# Patient Record
Sex: FEMALE | Race: WHITE | NOT HISPANIC OR LATINO | Employment: FULL TIME | URBAN - METROPOLITAN AREA
[De-identification: names, ages, dates, MRNs, and addresses within clinical notes are randomized per-mention and may not be internally consistent; named-entity substitution may affect disease eponyms.]

---

## 2017-02-20 ENCOUNTER — MYC MEDICAL ADVICE (OUTPATIENT)
Dept: FAMILY MEDICINE | Facility: CLINIC | Age: 42
End: 2017-02-20

## 2017-02-20 DIAGNOSIS — Z01.419 WELL WOMAN EXAM WITH ROUTINE GYNECOLOGICAL EXAM: ICD-10-CM

## 2017-02-21 RX ORDER — DESOGESTREL AND ETHINYL ESTRADIOL 0.15-0.03
1 KIT ORAL DAILY
Qty: 84 TABLET | Refills: 0 | Status: SHIPPED | OUTPATIENT
Start: 2017-02-21 | End: 2017-02-21

## 2017-02-21 RX ORDER — DESOGESTREL AND ETHINYL ESTRADIOL 0.15-0.03
1 KIT ORAL DAILY
Qty: 84 TABLET | Refills: 1 | Status: SHIPPED | OUTPATIENT
Start: 2017-02-21 | End: 2017-05-05

## 2017-05-05 ENCOUNTER — OFFICE VISIT (OUTPATIENT)
Dept: FAMILY MEDICINE | Facility: CLINIC | Age: 42
End: 2017-05-05
Payer: COMMERCIAL

## 2017-05-05 VITALS
HEART RATE: 55 BPM | DIASTOLIC BLOOD PRESSURE: 68 MMHG | HEIGHT: 61 IN | OXYGEN SATURATION: 100 % | SYSTOLIC BLOOD PRESSURE: 109 MMHG | TEMPERATURE: 97.1 F | BODY MASS INDEX: 22.66 KG/M2 | RESPIRATION RATE: 16 BRPM | WEIGHT: 120 LBS

## 2017-05-05 DIAGNOSIS — Z30.9 ENCOUNTER FOR CONTRACEPTIVE MANAGEMENT, UNSPECIFIED CONTRACEPTIVE ENCOUNTER TYPE: ICD-10-CM

## 2017-05-05 DIAGNOSIS — Z01.419 WELL WOMAN EXAM WITH ROUTINE GYNECOLOGICAL EXAM: Primary | ICD-10-CM

## 2017-05-05 DIAGNOSIS — Z80.8 FAMILY HISTORY OF BASAL CELL CARCINOMA: ICD-10-CM

## 2017-05-05 PROCEDURE — 99396 PREV VISIT EST AGE 40-64: CPT | Performed by: NURSE PRACTITIONER

## 2017-05-05 RX ORDER — DESOGESTREL AND ETHINYL ESTRADIOL 0.15-0.03
1 KIT ORAL DAILY
Qty: 84 TABLET | Refills: 3 | Status: SHIPPED | OUTPATIENT
Start: 2017-05-05 | End: 2018-02-02

## 2017-05-05 NOTE — MR AVS SNAPSHOT
After Visit Summary   5/5/2017    Chela Villatoro    MRN: [id number]           Patient Information     Date Of Birth          1975        Visit Information        Provider Department      5/5/2017 2:40 PM Susan Mireles APRN [facility]        Today's Diagnoses     Well woman exam with routine gynecological exam    -  1    Encounter for contraceptive management, unspecified contraceptive encounter type        Family history of basal cell carcinoma           Follow-ups after your visit        Additional Services     DERMATOLOGY REFERRAL       Your provider has referred you to: FMG: [facility] - [place] [phone]   [email or web address]  UMP: [facility] - [place] [phone]   [email or web address]  N: [facility] - [place] / [place] / [place] / [place] [phone]   [email or web address]  N: [facility] [place] [phone]   [email or web address]  [facility] - [place] [phone]   [email or web address]  [facility] [place] [phone]   [email or web address]    Please be aware that coverage of these services is subject to the terms and limitations of your health insurance plan.  Call member services at your health plan with any benefit or coverage questions.      Please bring the following to your appointment:  Any x-rays, CTs or MRIs which have been performed.  Contact the facility where they were done to arrange for  prior to your scheduled appointment.  Any new CT, MRI or other procedures ordered by your specialist must be performed at a [place] facility or coordinated by your clinic's referral office.    List of current medications   This referral request   Any documents/labs given to  you for this referral            OB/GYN REFERRAL       Your provider has referred you to:  FMG: [facility] [phone]   [email or web address]    Please be aware that coverage of these services is subject to the terms and limitations of your health insurance plan.  Call member services at your health plan with any benefit or coverage questions.      Please bring the following with you to your appointment:    (1) Any X-Rays, CTs or MRIs which have been performed.  Contact the facility where they were done to arrange for  prior to your scheduled appointment.   (2) List of current medications   (3) This referral request   (4) Any documents/labs given to you for this referral                  Who to contact     If you have questions or need follow up information about today's clinic visit or your schedule please contact [facility] directly at [phone].  Normal or non-critical lab and imaging results will be communicated to you by MyChart, letter or phone within 4 business days after the clinic has received the results. If you do not hear from us within 7 days, please contact the clinic through MyChart or phone. If you have a critical or abnormal lab result, we will notify you by phone as soon as possible.  Submit refill requests through PixSense or call your pharmacy and they will forward the refill request to us. Please allow 3 business days for your refill to be completed.          Additional Information About Your Visit        Hive7hart Information     PixSense gives you secure access to your electronic health record. If you see a primary care provider, you can also send messages to your care team and make appointments. If you have questions, please call your primary care clinic.  If you do not have a primary care provider, please call [phone] and they will assist you.        Care EveryWhere ID     This is your Care EveryWhere ID.  "This could be used by other organizations to access your [place] medical records  [id number]        Your Vitals Were     Pulse Temperature Respirations Height Last Period Pulse Oximetry    55 97.1  F (36.2  C) (Oral) 16 5' 1.25\" (1.556 m) 04/10/2017 (Approximate) 100%    BMI (Body Mass Index)                   22.49 kg/m2            Blood Pressure from Last 3 Encounters:   05/05/17 109/68   11/03/16 90/56   10/31/16 90/56    Weight from Last 3 Encounters:   05/05/17 120 lb (54.4 kg)   11/03/16 122 lb (55.3 kg)   10/31/16 122 lb 12.8 oz (55.7 kg)              We Performed the Following     DERMATOLOGY REFERRAL     OB/GYN REFERRAL          Where to get your medicines      These medications were sent to [facility] [address] AT [address] [address]     Phone:  [phone]     desogestrel-ethinyl estradiol 0.15-30 MG-MCG per tablet          Primary Care Provider Office Phone # Fax #    Susan Velazquez MANUEL [facility] [phone]       [address] [address]        Thank you!     Thank you for choosing [facility]  for your care. Our goal is always to provide you with excellent care. Hearing back from our patients is one way we can continue to improve our services. Please take a few minutes to complete the written survey that you may receive in the mail after your visit with us. Thank you!             Your Updated Medication List - Protect others around you: Learn how to safely use, store and throw away your medicines at www.disposemymeds.org.          This list is accurate as of: 5/5/17 11:59 PM.  Always use your most recent med list.                   Brand Name Dispense Instructions for use    desogestrel-ethinyl estradiol 0.15-30 MG-MCG per tablet    RECLIPSEN    84 tablet    Take 1 tablet by mouth daily         "

## 2017-05-05 NOTE — PROGRESS NOTES
SUBJECTIVE:     CC: Chela Villatoro is an 42 year old woman who presents for preventive health visit.     Physical   Annual:     Getting at least 3 servings of Calcium per day::  Yes    Bi-annual eye exam::  NO    Dental care twice a year::  Yes    Sleep apnea or symptoms of sleep apnea::  None    Diet::  Regular (no restrictions)    Frequency of exercise::  4-5 days/week    Duration of exercise::  30-45 minutes    Taking medications regularly::  Yes    Medication side effects::  None    Additional concerns today::  No      Today's PHQ-2 Score:   PHQ-2 ( 1999 Pfizer) 5/5/2017   Little interest or pleasure in doing things Not at all   Feeling down, depressed or hopeless Not at all   PHQ-2 Score 0       Abuse: Current or Past(Physical, Sexual or Emotional)- No  Do you feel safe in your environment - Yes    Social History   Substance Use Topics     Smoking status: Never Smoker     Smokeless tobacco: Never Used     Alcohol use 0.5 oz/week      Comment: occasionally     The patient does not drink >3 drinks per day nor >7 drinks per week.    Recent Labs   Lab Test  12/20/12   0836 09/16/09   CHOL  204*  176   HDL  100  70   LDL  75  94   TRIG  143  61   CHOLHDLRATIO  2.0  2.5       Reviewed orders with patient.  Reviewed health maintenance and updated orders accordingly - Yes    Mammo Decision Support:  Mammogram not appropriate for this patient based on age.    Pertinent mammograms are reviewed under the imaging tab.  History of abnormal Pap smear: NO - age 30- 65 PAP every 3 years recommended    Reviewed and updated as needed this visit by clinical staff  Tobacco  Allergies  Meds  Problems  Med Hx  Surg Hx  Fam Hx  Soc Hx          Reviewed and updated as needed this visit by Provider  Allergies  Meds  Problems  Med Hx  Surg Hx  Fam Hx            ROS:  C: NEGATIVE for fever, chills, change in weight  I: NEGATIVE for worrisome rashes, moles or lesions  E: NEGATIVE for vision changes or irritation  ENT:  "NEGATIVE for ear, mouth and throat problems  R: NEGATIVE for significant cough or SOB  B: NEGATIVE for masses, tenderness or discharge  CV: NEGATIVE for chest pain, palpitations or peripheral edema  GI: NEGATIVE for nausea, abdominal pain, heartburn, or change in bowel habits  : NEGATIVE for unusual urinary or vaginal symptoms. Periods are regular.  M: NEGATIVE for significant arthralgias or myalgia  N: NEGATIVE for weakness, dizziness or paresthesias  P: NEGATIVE for changes in mood or affect    Problem list, Medication list, Allergies, and Medical/Social/Surgical histories reviewed in [facility] and updated as appropriate.  Labs reviewed in EPIC  BP Readings from Last 3 Encounters:   05/05/17 109/68   11/03/16 90/56   10/31/16 90/56    Wt Readings from Last 3 Encounters:   05/05/17 120 lb (54.4 kg)   11/03/16 122 lb (55.3 kg)   10/31/16 122 lb 12.8 oz (55.7 kg)                  OBJECTIVE:     /68  Pulse 55  Temp 97.1  F (36.2  C) (Oral)  Resp 16  Ht 5' 1.25\" (1.556 m)  Wt 120 lb (54.4 kg)  LMP 04/10/2017 (Approximate)  SpO2 100%  BMI 22.49 kg/m2  EXAM:  GENERAL: healthy, alert and no distress  EYES: Eyes grossly normal to inspection, PERRL and conjunctivae and sclerae normal  HENT: ear canals and TM's normal, nose and mouth without ulcers or lesions  NECK: no adenopathy, no asymmetry, masses, or scars and thyroid normal to palpation  RESP: lungs clear to auscultation - no rales, rhonchi or wheezes  BREAST: normal without masses, tenderness or nipple discharge and no palpable axillary masses or adenopathy  CV: regular rate and rhythm, normal S1 S2, no S3 or S4, no murmur, click or rub, no peripheral edema and peripheral pulses strong  ABDOMEN: soft, nontender, no hepatosplenomegaly, no masses and bowel sounds normal  MS: no gross musculoskeletal defects noted, no edema  SKIN: no suspicious lesions or rashes  NEURO: Normal strength and tone, mentation intact and speech normal  PSYCH: mentation appears " "normal, affect normal/bright    ASSESSMENT/PLAN:         ICD-10-CM    1. Well woman exam with routine gynecological exam Z01.419 desogestrel-ethinyl estradiol (RECLIPSEN) 0.15-30 MG-MCG per tablet   2. Encounter for contraceptive management, unspecified contraceptive encounter type Z30.9 OB/GYN REFERRAL   3. Family history of basal cell carcinoma Z80.8 DERMATOLOGY REFERRAL     Refer to GYN.  Would like to consider IUD vs OCP.      Refer to derm for full skin check  Used to tan a lot and mother had several Basal cell carcinomas.    COUNSELING:  Reviewed preventive health counseling, as reflected in patient instructions         reports that she has never smoked. She has never used smokeless tobacco.    Estimated body mass index is 22.49 kg/(m^2) as calculated from the following:    Height as of this encounter: 5' 1.25\" (1.556 m).    Weight as of this encounter: 120 lb (54.4 kg).       Counseling Resources:  ATP IV Guidelines  Pooled Cohorts Equation Calculator  Breast Cancer Risk Calculator  FRAX Risk Assessment  ICSI Preventive Guidelines  Dietary Guidelines for Americans, 2010  USDA's MyPlate  ASA Prophylaxis  Lung CA Screening    MANUEL Duran CNP  [facility]  Answers for HPI/ROS submitted by the patient on 5/5/2017   Q1: Little interest or pleasure in doing things: 0=Not at all  Q2: Feeling down, depressed or hopeless: 0=Not at all  PHQ-2 Score: 0    "

## 2017-11-14 ENCOUNTER — ALLIED HEALTH/NURSE VISIT (OUTPATIENT)
Dept: NURSING | Facility: CLINIC | Age: 42
End: 2017-11-14
Payer: COMMERCIAL

## 2017-11-14 DIAGNOSIS — Z23 NEED FOR PROPHYLACTIC VACCINATION AND INOCULATION AGAINST INFLUENZA: Primary | ICD-10-CM

## 2017-11-14 PROCEDURE — 90471 IMMUNIZATION ADMIN: CPT

## 2017-11-14 PROCEDURE — 90686 IIV4 VACC NO PRSV 0.5 ML IM: CPT

## 2017-11-14 NOTE — MR AVS SNAPSHOT
After Visit Summary   11/14/2017    Chela Villatoro    MRN: [id number]           Patient Information     Date Of Birth          1975        Visit Information        Provider Department      11/14/2017 6:15 PM [facility] FLU [facility]IC [facility]        Today's Diagnoses     Need for prophylactic vaccination and inoculation against influenza    -  1       Follow-ups after your visit        Who to contact     If you have questions or need follow up information about today's clinic visit or your schedule please contact [facility] directly at [phone].  Normal or non-critical lab and imaging results will be communicated to you by Nimble CRMhart, letter or phone within 4 business days after the clinic has received the results. If you do not hear from us within 7 days, please contact the clinic through Local Liftt or phone. If you have a critical or abnormal lab result, we will notify you by phone as soon as possible.  Submit refill requests through CreditPoint Software or call your pharmacy and they will forward the refill request to us. Please allow 3 business days for your refill to be completed.          Additional Information About Your Visit        MyChart Information     CreditPoint Software gives you secure access to your electronic health record. If you see a primary care provider, you can also send messages to your care team and make appointments. If you have questions, please call your primary care clinic.  If you do not have a primary care provider, please call [phone] and they will assist you.        Care EveryWhere ID     This is your Care EveryWhere ID. This could be used by other organizations to access your [place] medical records  [id number]         Blood Pressure from Last 3 Encounters:   05/05/17 109/68   11/03/16 90/56   10/31/16 90/56    Weight from Last 3 Encounters:   05/05/17 120 lb (54.4 kg)   11/03/16 122 lb (55.3 kg)   10/31/16 122 lb 12.8 oz (55.7 kg)               We Performed the Following     FLU VAC, SPLIT VIRUS IM > 3 YO (QUADRIVALENT) [92406]     Vaccine Administration, Initial [92040]        Primary Care Provider Office Phone # Fax #    MANUEL Duran [phone]  [phone]        Equal Access to Services     JAY DAS : Hadii aad ku hadasho Soomaali, waaxda luqadaha, qaybta kaalmada adeegyada, waxay idiin hayaan adekarl ritteraraselena naylor . So [facility] [phone].    ATENCIÓN: Si habla español, tiene a raman disposición servicios gratuitos de asistencia lingüística. Joseame al [phone].    We comply with applicable federal civil rights laws and [place] laws. We do not discriminate on the basis of race, color, national origin, age, disability, sex, sexual orientation, or gender identity.            Thank you!     Thank you for choosing [facility]  for your care. Our goal is always to provide you with excellent care. Hearing back from our patients is one way we can continue to improve our services. Please take a few minutes to complete the written survey that you may receive in the mail after your visit with us. Thank you!             Your Updated Medication List - Protect others around you: Learn how to safely use, store and throw away your medicines at www.disposemymeds.org.          This list is accurate as of: 11/14/17  6:20 PM.  Always use your most recent med list.                   Brand Name Dispense Instructions for use Diagnosis    desogestrel-ethinyl estradiol 0.15-30 MG-MCG per tablet    RECLIPSEN    84 tablet    Take 1 tablet by mouth daily    Well woman exam with routine gynecological exam

## 2018-02-02 ENCOUNTER — OFFICE VISIT (OUTPATIENT)
Dept: MIDWIFE SERVICES | Facility: CLINIC | Age: 43
End: 2018-02-02
Payer: COMMERCIAL

## 2018-02-02 ENCOUNTER — RADIANT APPOINTMENT (OUTPATIENT)
Dept: MAMMOGRAPHY | Facility: CLINIC | Age: 43
End: 2018-02-02
Payer: COMMERCIAL

## 2018-02-02 VITALS
HEIGHT: 62 IN | BODY MASS INDEX: 22.82 KG/M2 | WEIGHT: 124 LBS | SYSTOLIC BLOOD PRESSURE: 116 MMHG | DIASTOLIC BLOOD PRESSURE: 82 MMHG

## 2018-02-02 DIAGNOSIS — Z13.220 ENCOUNTER FOR LIPID SCREENING FOR CARDIOVASCULAR DISEASE: ICD-10-CM

## 2018-02-02 DIAGNOSIS — Z23 NEED FOR PROPHYLACTIC VACCINATION WITH COMBINED DIPHTHERIA-TETANUS-PERTUSSIS (DTP) VACCINE: ICD-10-CM

## 2018-02-02 DIAGNOSIS — Z13.29 SCREENING FOR THYROID DISORDER: ICD-10-CM

## 2018-02-02 DIAGNOSIS — Z01.419 ENCOUNTER FOR GYNECOLOGICAL EXAMINATION WITHOUT ABNORMAL FINDING: Primary | ICD-10-CM

## 2018-02-02 DIAGNOSIS — Z13.6 ENCOUNTER FOR LIPID SCREENING FOR CARDIOVASCULAR DISEASE: ICD-10-CM

## 2018-02-02 DIAGNOSIS — Z12.31 VISIT FOR SCREENING MAMMOGRAM: ICD-10-CM

## 2018-02-02 DIAGNOSIS — Z01.419 WELL WOMAN EXAM WITH ROUTINE GYNECOLOGICAL EXAM: ICD-10-CM

## 2018-02-02 DIAGNOSIS — Z30.41 USES ORAL CONTRACEPTION: ICD-10-CM

## 2018-02-02 DIAGNOSIS — Z13.1 SCREENING FOR DIABETES MELLITUS: ICD-10-CM

## 2018-02-02 PROCEDURE — 87624 HPV HI-RISK TYP POOLED RSLT: CPT | Performed by: ADVANCED PRACTICE MIDWIFE

## 2018-02-02 PROCEDURE — 80061 LIPID PANEL: CPT | Performed by: ADVANCED PRACTICE MIDWIFE

## 2018-02-02 PROCEDURE — 82947 ASSAY GLUCOSE BLOOD QUANT: CPT | Performed by: ADVANCED PRACTICE MIDWIFE

## 2018-02-02 PROCEDURE — 36415 COLL VENOUS BLD VENIPUNCTURE: CPT | Performed by: ADVANCED PRACTICE MIDWIFE

## 2018-02-02 PROCEDURE — 77067 SCR MAMMO BI INCL CAD: CPT | Mod: TC

## 2018-02-02 PROCEDURE — 99396 PREV VISIT EST AGE 40-64: CPT | Performed by: ADVANCED PRACTICE MIDWIFE

## 2018-02-02 PROCEDURE — G0145 SCR C/V CYTO,THINLAYER,RESCR: HCPCS | Performed by: ADVANCED PRACTICE MIDWIFE

## 2018-02-02 PROCEDURE — 77063 BREAST TOMOSYNTHESIS BI: CPT | Mod: TC

## 2018-02-02 PROCEDURE — 84443 ASSAY THYROID STIM HORMONE: CPT | Performed by: ADVANCED PRACTICE MIDWIFE

## 2018-02-02 PROCEDURE — 90715 TDAP VACCINE 7 YRS/> IM: CPT | Performed by: ADVANCED PRACTICE MIDWIFE

## 2018-02-02 RX ORDER — DESOGESTREL AND ETHINYL ESTRADIOL 0.15-0.03
1 KIT ORAL DAILY
Qty: 84 TABLET | Refills: 3 | Status: SHIPPED | OUTPATIENT
Start: 2018-02-02 | End: 2018-08-07

## 2018-02-02 ASSESSMENT — ANXIETY QUESTIONNAIRES
2. NOT BEING ABLE TO STOP OR CONTROL WORRYING: NOT AT ALL
IF YOU CHECKED OFF ANY PROBLEMS ON THIS QUESTIONNAIRE, HOW DIFFICULT HAVE THESE PROBLEMS MADE IT FOR YOU TO DO YOUR WORK, TAKE CARE OF THINGS AT HOME, OR GET ALONG WITH OTHER PEOPLE: NOT DIFFICULT AT ALL
1. FEELING NERVOUS, ANXIOUS, OR ON EDGE: NOT AT ALL
5. BEING SO RESTLESS THAT IT IS HARD TO SIT STILL: NOT AT ALL
3. WORRYING TOO MUCH ABOUT DIFFERENT THINGS: NOT AT ALL
7. FEELING AFRAID AS IF SOMETHING AWFUL MIGHT HAPPEN: NOT AT ALL
6. BECOMING EASILY ANNOYED OR IRRITABLE: NOT AT ALL
GAD7 TOTAL SCORE: 0

## 2018-02-02 ASSESSMENT — PATIENT HEALTH QUESTIONNAIRE - PHQ9: 5. POOR APPETITE OR OVEREATING: NOT AT ALL

## 2018-02-02 NOTE — MR AVS SNAPSHOT
After Visit Summary   2/2/2018    Chela Villatoro    MRN: [id number]           Patient Information     Date Of Birth          1975        Visit Information        Provider Department      2/2/2018 10:00 AM Trudy Esquivel APRN [facility]        Today's Diagnoses     Encounter for gynecological examination without abnormal finding    -  1    Well woman exam with routine gynecological exam        Encounter for lipid screening for cardiovascular disease        Screening for diabetes mellitus        Screening for thyroid disorder        Uses oral contraception          Care Instructions      Preventive Health Recommendations  Female Ages 40-50    Yearly exam:     See your health care provider every year in order to  1. Review health changes   2. Discuss preventive care    3. Review your medicines if your provider prescribed any      Get a Pap test every five years with co-testing for HPV (unless you have an abnormal result and your provider advises testing more often)       You do not need a Pap test if your uterus was removed (hysterectomy) and you have not had cancer      You should be tested each year for STD's (sexually transmitted diseases) if you are at risk      Talk with your provider about starting mammogram screenings for breast cancer and how often you should be screened      Have a cholesterol test every 3-5 years       Have a diabetes test (fasting glucose) after age 45. If you are at risk for diabetes, you should have this test every 3 years      You may begin to experience some changes in your menstrual cycle as you age, you may also begin to have some symptoms of perimenopause such as hot flashes. Women typically go through menopause anytime between 45-55 years of age.    Shots: Get a flu shot each year. Get a tetanus shot every 10 years.     Nutrition:     Eat at least 5 servings of fruits and vegetables each day    Eat REAL food, stay away from processed  foods    Eat whole-grain bread, whole-wheat pasta and brown rice instead of white grains and rice    For bone health:  Eat calcium-rich foods or take calcium pills (500 to 600 mg) twice a day with food (1200 mg per day). Also take vitamin D (1,000-3,000 IUs) each day.     Lifestyle    Exercise at least 150 minutes a week (an average of 30 minutes a day, 5 days a week). This will help you control your weight and prevent disease.    Limit alcohol to one drink per day    No smoking     Wear sunscreen to prevent skin cancer    See your dentist every six months for an exam and cleaning    Weight bearing exercise to encourage good bone health prevent osteoporosis            Follow-ups after your visit        Follow-up notes from your care team     Return in about 1 year (around 2/2/2019).      Who to contact     If you have questions or need follow up information about today's clinic visit or your schedule please contact [facility] directly at [phone].  Normal or non-critical lab and imaging results will be communicated to you by TG Publishinghart, letter or phone within 4 business days after the clinic has received the results. If you do not hear from us within 7 days, please contact the clinic through Audionamix or phone. If you have a critical or abnormal lab result, we will notify you by phone as soon as possible.  Submit refill requests through Audionamix or call your pharmacy and they will forward the refill request to us. Please allow 3 business days for your refill to be completed.          Additional Information About Your Visit        Audionamix Information     Audionamix gives you secure access to your electronic health record. If you see a primary care provider, you can also send messages to your care team and make appointments. If you have questions, please call your primary care clinic.  If you do not have a primary care provider, please call [phone] and they will assist you.        Care EveryWhere  "ID     This is your Care EveryWhere ID. This could be used by other organizations to access your [place] medical records  [id number]        Your Vitals Were     Height Last Period Breastfeeding? BMI (Body Mass Index)          5' 1.5\" (1.562 m) 01/08/2018 (Exact Date) No 23.05 kg/m2         Blood Pressure from Last 3 Encounters:   02/02/18 116/82   05/05/17 109/68   11/03/16 90/56    Weight from Last 3 Encounters:   02/02/18 124 lb (56.2 kg)   05/05/17 120 lb (54.4 kg)   11/03/16 122 lb (55.3 kg)              We Performed the Following     Glucose     HPV High Risk Types DNA Cervical     Lipid panel reflex to direct LDL Fasting     Pap imaged thin layer screen with HPV - recommended age 30 - 65     TSH with free T4 reflex          Where to get your medicines      These medications were sent to [facility] [address] [place] [address] AT [address] [address]     Phone:  [phone]     desogestrel-ethinyl estradiol 0.15-30 MG-MCG per tablet          Primary Care Provider Office Phone # Fax #    Susan LaceyMANUEL Morrison [facility] [phone] [address] [facility] [address]        Equal Access to Services     JAY DAS : Hadii kunal mccarthy hadasho Soomaali, waaxda luqadaha, qaybta kaalmada adesabrina, guido ewing. So [facility] [phone].    ATENCIÓN: Si habla español, tiene a raman disposición servicios gratuitos de asistencia lingüística. Roldan al [phone].    We comply with applicable federal civil rights laws and [place] laws. We do not discriminate on the basis of race, color, national origin, age, disability, sex, sexual orientation, or gender identity.            Thank you!     Thank you for choosing [facility]  for your care. Our goal is always to provide you with excellent care. Hearing back from our patients is one way we can continue to improve our services. Please take a few " minutes to complete the written survey that you may receive in the mail after your visit with us. Thank you!             Your Updated Medication List - Protect others around you: Learn how to safely use, store and throw away your medicines at www.disposemymeds.org.          This list is accurate as of 2/2/18 10:44 AM.  Always use your most recent med list.                   Brand Name Dispense Instructions for use Diagnosis    desogestrel-ethinyl estradiol 0.15-30 MG-MCG per tablet    RECLIPSEN    84 tablet    Take 1 tablet by mouth daily    Well woman exam with routine gynecological exam

## 2018-02-02 NOTE — PATIENT INSTRUCTIONS
Preventive Health Recommendations  Female Ages 40-50    Yearly exam:     See your health care provider every year in order to  1. Review health changes   2. Discuss preventive care    3. Review your medicines if your provider prescribed any      Get a Pap test every five years with co-testing for HPV (unless you have an abnormal result and your provider advises testing more often)       You do not need a Pap test if your uterus was removed (hysterectomy) and you have not had cancer      You should be tested each year for STD's (sexually transmitted diseases) if you are at risk      Talk with your provider about starting mammogram screenings for breast cancer and how often you should be screened      Have a cholesterol test every 3-5 years       Have a diabetes test (fasting glucose) after age 45. If you are at risk for diabetes, you should have this test every 3 years      You may begin to experience some changes in your menstrual cycle as you age, you may also begin to have some symptoms of perimenopause such as hot flashes. Women typically go through menopause anytime between 45-55 years of age.    Shots: Get a flu shot each year. Get a tetanus shot every 10 years.     Nutrition:     Eat at least 5 servings of fruits and vegetables each day    Eat REAL food, stay away from processed foods    Eat whole-grain bread, whole-wheat pasta and brown rice instead of white grains and rice    For bone health:  Eat calcium-rich foods or take calcium pills (500 to 600 mg) twice a day with food (1200 mg per day). Also take vitamin D (1,000-3,000 IUs) each day.     Lifestyle    Exercise at least 150 minutes a week (an average of 30 minutes a day, 5 days a week). This will help you control your weight and prevent disease.    Limit alcohol to one drink per day    No smoking     Wear sunscreen to prevent skin cancer    See your dentist every six months for an exam and cleaning    Weight bearing exercise to encourage good bone  health prevent osteoporosis

## 2018-02-02 NOTE — PROGRESS NOTES
Chela is a 43 year old  female who presents for annual exam.     Besides routine health maintenance, she has no other health concerns today.  Menses are regular q 28-30 days and normal lasting 4 days.   Menses flow: normal.    Patient's last menstrual period was 2018 (exact date).  Using oral contraceptives for contraception.    She is not currently considering pregnancy.    REPRODUCTIVE/GYNECOLOGIC HISTORY:  Chela is sexually active with male partner(s) and is currently in monogamous relationship.   STI testing offered?  Declined  History of abnormal Pap smear:  Yes  SOCIAL HISTORY  Abuse: does not report having previously been physical or sexually abused.    Do you feel safe in your environment? YES  Excited to have recently gotten engaged! Has two children, age 15 and 13; her fiance has one son, age 14.    She  reports that she has never smoked. She has never used smokeless tobacco.      Last PHQ-9 score on record =   PHQ-9 SCORE 2018   Total Score -   Total Score 0     Last GAD7 score on record =   ALMAS-7 SCORE 2018   Total Score -   Total Score 0     Alcohol Score = 3    HEALTH MAINTENANCE:  Cholesterol:   Cholesterol   Date Value Ref Range Status   2012 204 (H) 0 - 200 mg/dL Final     Comment:     LDL Cholesterol is the primary guide to therapy.   The NCEP recommends further evaluation of: patients with cholesterol greater   than 200 mg/dL if additional risk factors are present, cholesterol greater   than   240 mg/dL, triglycerides greater than 150 mg/dL, or HDL less than 40 mg/dL.   2009 176 115 - 199 mg/dL    History of abnormal lipids: No  Mammo: Never.   Regular Self Breast Exams: No  TSH:   TSH   Date Value Ref Range Status   2013 0.65 0.4 - 5.0 mU/L Final     Pap:  Lab Results   Component Value Date    PAP ASC-US 2015    PAP NIL 2011    PAP NIL 2010     Health maintenance updated:  yes    HEALTHY HABITS  Eating habits: eats regular meals and  follows a balanced nutrition diet  Calcium/Vitamin D intake: source:  Dairy (cheese and yogurt).  Does not drink milk Adequate? No.  Recommended calcium and vitamin D supplementation.  Exercise: How often do you exercise? 3-4 times/week; Cardio and Strength Training, goes to [employer]  Have you had an eye exam in the last two years? NO (Recommended)  Do you routinely see the dentist once or twice yearly? YES      HISTORY:  Obstetric History       T2      L2     SAB0   TAB0   Ectopic0   Multiple0   Live Births2       # Outcome Date GA Lbr Maykel/2nd Weight Sex Delivery Anes PTL Lv   2 Term 08/15/04    M    MATTHIAS   1 Term 02    F    MATTHIAS        Past Medical History:   Diagnosis Date     ASCUS favor benign 2015    neg HPV Plan cotest in 3 yrs.     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 No     Social History Narrative    Caffeine intake/servings daily - 1    Calcium intake/servings daily - 1-2    Exercise 3 times weekly - describe walking, running    Sunscreen used - Yes    Seatbelts used - Yes    Guns stored in the home - No  "   Self Breast Exam - occ    Pap test up to date -  Last pap 5/13/2010    Eye exam up to date -  Yes    Dental exam up to date -  Yes    DEXA scan up to date -  Not Applicable    Flex Sig/Colonoscopy up to date -  Not Applicable    Mammography up to date -  Not Applicable    Immunizations reviewed and up to date - tdap 2008. Flu shot already    Abuse: Current or Past (Physical, Sexual or Emotional) - No    Do you feel safe in your environment - Yes    Do you cope well with stress - Yes    Do you suffer from insomnia - No    Last updated by: Shruti Eason MA.                               Current Outpatient Prescriptions:      desogestrel-ethinyl estradiol (RECLIPSEN) 0.15-30 MG-MCG per tablet, Take 1 tablet by mouth daily, Disp: 84 tablet, Rfl: 3     [DISCONTINUED] desogestrel-ethinyl estradiol (RECLIPSEN) 0.15-30 MG-MCG per tablet, Take 1 tablet by mouth daily, Disp: 84 tablet, Rfl: 3     Allergies   Allergen Reactions     Nkda [No Known Drug Allergies]        Past medical, surgical, social and family history were reviewed and updated in EPIC.    ROS:   12 point review of systems negative other than symptoms noted below.  Musculoskeletal: Joint Pain    PHYSICAL EXAM:  /82  Ht 5' 1.5\" (1.562 m)  Wt 124 lb (56.2 kg)  LMP 01/08/2018 (Exact Date)  Breastfeeding? No  BMI 23.05 kg/m2   BMI: Body mass index is 23.05 kg/(m^2).  Constitutional: healthy, alert and no distress  Neck: symmetrical, thyroid normal size, no masses present, no lymphadenopathy present.   Cardiovascular: RRR, no murmurs present  Respiratory: breathing unlabored, lungs CTA bilaterally  Breast: normal without masses, tenderness or nipple discharge and no palpable axillary masses or adenopathy  Gastrointestinal: abdomen soft, non-tender, bowel sounds present  PELVIC EXAM:  Vulva: No lesions, no adenopathy, BUS WNL  Vagina: Moist, pink, discharge normal, well rugated, no lesions  Cervix: smooth, pink, small nabothian cyst present at 8' " oclock. Pap smear collected.  Uterus: Normal size, non-tender, mobile  Ovaries: No masses palpated  Rectal exam: deferred    ASSESSMENT/PLAN:    ICD-10-CM    1. Encounter for gynecological examination without abnormal finding Z01.419 Pap imaged thin layer screen with HPV - recommended age 30 - 65     HPV High Risk Types DNA Cervical   2. Well woman exam with routine gynecological exam Z01.419 desogestrel-ethinyl estradiol (RECLIPSEN) 0.15-30 MG-MCG per tablet   3. Encounter for lipid screening for cardiovascular disease Z13.220 Lipid panel reflex to direct LDL Fasting    Z13.6    4. Screening for diabetes mellitus Z13.1 Glucose   5. Screening for thyroid disorder Z13.29 TSH with free T4 reflex           COUNSELING:   Reviewed preventive health counseling, as reflected in patient instructions       Regular exercise       Healthy diet/nutrition       Vision screening       Immunizations    Vaccinated for: TDAP  (last Tetanus in 2008).         Contraception: oral contraceptive pill refill sent to pharmacy.       Osteoporosis Prevention/Bone Health: Recommended calcium and vitamin D.  Mammogram today.  Educated on breast warning signs and breast self-awareness.  Pap smear cotest collected today.  Lipids, random glucose and TSH drawn today.  Considering a Mirena, as she has had one in the past.  Will schedule an appointment if she would like to have one placed.     reports that she has never smoked. She has never used smokeless tobacco.    Return to clinic in one year for annual exam or sooner with any concerns.    Trudy Esquivel, DNP, APRN, CNM

## 2018-02-03 LAB
CHOLEST SERPL-MCNC: 236 MG/DL
GLUCOSE SERPL-MCNC: 68 MG/DL (ref 70–99)
HDLC SERPL-MCNC: 118 MG/DL
LDLC SERPL CALC-MCNC: 95 MG/DL
NONHDLC SERPL-MCNC: 118 MG/DL
TRIGL SERPL-MCNC: 117 MG/DL
TSH SERPL DL<=0.005 MIU/L-ACNC: 1.12 MU/L (ref 0.4–4)

## 2018-02-03 ASSESSMENT — PATIENT HEALTH QUESTIONNAIRE - PHQ9: SUM OF ALL RESPONSES TO PHQ QUESTIONS 1-9: 0

## 2018-02-03 ASSESSMENT — ANXIETY QUESTIONNAIRES: GAD7 TOTAL SCORE: 0

## 2018-02-06 LAB
COPATH REPORT: NORMAL
PAP: NORMAL

## 2018-02-08 LAB
FINAL DIAGNOSIS: NORMAL
HPV HR 12 DNA CVX QL NAA+PROBE: NEGATIVE
HPV16 DNA SPEC QL NAA+PROBE: NEGATIVE
HPV18 DNA SPEC QL NAA+PROBE: NEGATIVE
SPECIMEN DESCRIPTION: NORMAL
SPECIMEN SOURCE CVX/VAG CYTO: NORMAL

## 2018-08-06 ENCOUNTER — E-VISIT (OUTPATIENT)
Dept: FAMILY MEDICINE | Facility: CLINIC | Age: 43
End: 2018-08-06
Payer: COMMERCIAL

## 2018-08-06 DIAGNOSIS — J01.90 ACUTE SINUSITIS WITH SYMPTOMS > 10 DAYS: Primary | ICD-10-CM

## 2018-08-06 PROCEDURE — 98969 ZZC NONPHYSICIAN ONLINE ASSESSMENT AND MANAGEMENT: CPT | Performed by: NURSE PRACTITIONER

## 2018-08-06 RX ORDER — AZITHROMYCIN 250 MG/1
TABLET, FILM COATED ORAL
Qty: 6 TABLET | Refills: 0 | Status: SHIPPED | OUTPATIENT
Start: 2018-08-06 | End: 2018-09-27

## 2018-08-15 ENCOUNTER — THERAPY VISIT (OUTPATIENT)
Dept: PHYSICAL THERAPY | Facility: CLINIC | Age: 43
End: 2018-08-15
Payer: COMMERCIAL

## 2018-08-15 DIAGNOSIS — M25.551 HIP PAIN, RIGHT: ICD-10-CM

## 2018-08-15 DIAGNOSIS — M54.5 RIGHT LOW BACK PAIN, UNSPECIFIED CHRONICITY, WITH SCIATICA PRESENCE UNSPECIFIED: Primary | ICD-10-CM

## 2018-08-15 PROCEDURE — 97530 THERAPEUTIC ACTIVITIES: CPT | Mod: GP | Performed by: PHYSICAL THERAPIST

## 2018-08-15 PROCEDURE — 97110 THERAPEUTIC EXERCISES: CPT | Mod: GP | Performed by: PHYSICAL THERAPIST

## 2018-08-15 PROCEDURE — 97161 PT EVAL LOW COMPLEX 20 MIN: CPT | Mod: GP | Performed by: PHYSICAL THERAPIST

## 2018-08-15 PROCEDURE — 97010 HOT OR COLD PACKS THERAPY: CPT | Mod: GP | Performed by: PHYSICAL THERAPIST

## 2018-08-15 NOTE — MR AVS SNAPSHOT
After Visit Summary   8/15/2018    Chela Villatoro    MRN: [id number]           Patient Information     Date Of Birth          1975        Visit Information        Provider Department      8/15/2018 10:20 AM Jillian Kinsey PT [facility]        Today's Diagnoses     Right low back pain, unspecified chronicity, with sciatica presence unspecified    -  1    Hip pain, right           Follow-ups after your visit        Who to contact     If you have questions or need follow up information about today's clinic visit or your schedule please contact [facility] directly at [phone].  Normal or non-critical lab and imaging results will be communicated to you by Vixarhart, letter or phone within 4 business days after the clinic has received the results. If you do not hear from us within 7 days, please contact the clinic through Vixarhart or phone. If you have a critical or abnormal lab result, we will notify you by phone as soon as possible.  Submit refill requests through Inbenta or call your pharmacy and they will forward the refill request to us. Please allow 3 business days for your refill to be completed.          Additional Information About Your Visit        MyChart Information     Inbenta gives you secure access to your electronic health record. If you see a primary care provider, you can also send messages to your care team and make appointments. If you have questions, please call your primary care clinic.  If you do not have a primary care provider, please call [phone] and they will assist you.        Care EveryWhere ID     This is your Care EveryWhere ID. This could be used by other organizations to access your [place] medical records  [id number]         Blood Pressure from Last 3 Encounters:   02/02/18 116/82   05/05/17 109/68   11/03/16 90/56    Weight from Last 3 Encounters:   02/02/18 56.2 kg (124 lb)   05/05/17 54.4 kg (120 lb)    11/03/16 55.3 kg (122 lb)              We Performed the Following     [facility] PT EVAL, LOW COMPLEXITY     HOT OR COLD PACKS THERAPY     [facility] INITIAL EVAL REPORT     THERAPEUTIC ACTIVITIES     THERAPEUTIC EXERCISES        Primary Care Provider Office Phone # Fax #    MANUEL Duran MOOK [phone] [address] [facility] [address]        Equal Access to Services     DEBORAH DAS : Hadii aad ku hadasho Soomaali, waaxda luqadaha, qaybta kaalmada adeegyada, waxay idiin hayaan adeeg kharash la'aan ah. So [facility] [phone].    ATENCIÓN: Si habla espambrosio, tiene a raman disposición servicios gratuitos de asistencia lingüística. Llame al [phone].    We comply with applicable federal civil rights laws and [place] laws. We do not discriminate on the basis of race, color, national origin, age, disability, sex, sexual orientation, or gender identity.            Thank you!     Thank you for choosing [facility]  for your care. Our goal is always to provide you with excellent care. Hearing back from our patients is one way we can continue to improve our services. Please take a few minutes to complete the written survey that you may receive in the mail after your visit with us. Thank you!             Your Updated Medication List - Protect others around you: Learn how to safely use, store and throw away your medicines at www.disposemymeds.org.          This list is accurate as of 8/15/18 10:57 AM.  Always use your most recent med list.                   Brand Name Dispense Instructions for use Diagnosis    azithromycin 250 MG tablet    ZITHROMAX    6 tablet    Two tablets first day, then one tablet daily for four days.    Acute sinusitis with symptoms > 10 days       desogestrel-ethinyl estradiol 0.15-30 MG-MCG per tablet    RECLIPSEN    84 tablet    Take 1 tablet by mouth daily    Well woman exam with routine gynecological exam

## 2018-08-15 NOTE — PROGRESS NOTES
[facility] Initial Evaluation  Subjective:  Physical Therapy Initial Evaluation   8/15/18   Precautions/Restrictions/MD instructions: PT eval and treat (self referral, need MD order after 90 days)   Therapist Impression:   Pt is a 42 y/o female, with 1 week history of right hip and low back pain. Pt presents with pain, decreased ROM/mobility, poor posture and decreased strength. These impairments limit their ability to walk, sit, stand and sleep. Skilled PT services necessary in order to reduce impairments and improve independent function.    Subjective:   Chief Complaint: R hip and low back pain, last week was playing cards reached forward and immediate pain in low back. Pt notes that has had low back pain before, but this is lasting longer.   DOI/onset: 18  DOS: none  Location: R sided low back and hip  Quality: sharp  Frequency: constant  Radiates: into R hip, nothing in leg   Pain scale: Rest 5/10 Activity 10/10    Sleepin-3X/night wakes due to pain   Exacerbated by: sitting, standing, walking, and sleeping  Relieved by: nothing Progression: worse   Previous Treatment: none Effect of prior treatment: n/a   PMH and/or surgical history: none to note   Imaging: none     Occupation:   Job duties: standing, walking, computer desk, driving   Current HEP/exercise regimen: nothing currently, prior to injury working out 4-5 X/week ([employer])  Patient's goals: walking painfree   Medications: BC   General health as reported by patient: good   Return to MD: self referred   Red Flags: none      HPI                    Objective:  LUMBAR:    Posture: rounded low back in sitting; slouched    Gait: analgic, shift away from right side and shortened step length on left      Neurological:    Motor Deficit:  Myotomes L R   L1-2 (hip flexion) 5/5 5/5   L3 (knee extension) 5/5 5/5   L4 (ankle DF) 5/5 5/5   L5 (g. toe ext) 5/5 5/5   S1 (ankle PF or knee flex) 5/5 5/5     Sensory Deficit,  Reflexes: light touch intact and symmetrical bilaterally L2-S1    Dural Signs:   L R   Slump + ++   SLR - +   Other:    - TA activation: poor      AROM: (Major, Moderate, Minimal or Nil loss)  Movement Loss Roshan Mod Min Nil Pain   Flexion X    Pain in low back    Extension X    Pain in low back (R)   Side Bend L   X  No pain   Side Bend R  X   Pain on right     Repeated movement testing:   Side glide limited to left and painful (on right side)    No pain with prone lying    Lumbar Mobility/Spring Testing: PA pressure tender to L4/5        System    Physical Exam    General     ROS    Assessment/Plan:    Patient is a 43 year old female with lumbar and right side hip complaints.    Patient has the following significant findings with corresponding treatment plan.                Diagnosis 1:  R hip pain, and LBP  Pain -  hot/cold therapy, manual therapy, self management, education, directional preference exercise and home program  Decreased ROM/flexibility - manual therapy and therapeutic exercise  Decreased joint mobility - manual therapy and therapeutic exercise  Decreased strength - therapeutic exercise and therapeutic activities  Inflammation - cold therapy and self management/home program  Impaired gait - gait training  Impaired muscle performance - neuro re-education  Decreased function - therapeutic activities  Impaired posture - neuro re-education  Instability -  Therapeutic Activity  Therapeutic Exercise    Therapy Evaluation Codes:   1) History comprised of:   Personal factors that impact the plan of care:      None.    Comorbidity factors that impact the plan of care are:      None.     Medications impacting care: Anti-inflammatory.  2) Examination of Body Systems comprised of:   Body structures and functions that impact the plan of care:      Hip and Lumbar spine.   Activity limitations that impact the plan of care are:      Driving, Dressing, Lifting, Running, Sitting, Stairs, Standing, Walking, Working and  Sleeping.  3) Clinical presentation characteristics are:   Stable/Uncomplicated.  4) Decision-Making    Low complexity using standardized patient assessment instrument and/or measureable assessment of functional outcome.  Cumulative Therapy Evaluation is: Low complexity.    Previous and current functional limitations:  (See Goal Flow Sheet for this information)    Short term and Long term goals: (See Goal Flow Sheet for this information)     Communication ability:  Patient appears to be able to clearly communicate and understand verbal and written communication and follow directions correctly.  Treatment Explanation - The following has been discussed with the patient:   RX ordered/plan of care  Anticipated outcomes  Possible risks and side effects  This patient would benefit from PT intervention to resume normal activities.   Rehab potential is good.    Frequency:  1 X week, once daily  Duration:  for 6 weeks  Discharge Plan:  Achieve all LTG.  Independent in home treatment program.  Reach maximal therapeutic benefit.    Please refer to the daily flowsheet for treatment today, total treatment time and time spent performing 1:1 timed codes.

## 2018-08-27 ENCOUNTER — THERAPY VISIT (OUTPATIENT)
Dept: PHYSICAL THERAPY | Facility: CLINIC | Age: 43
End: 2018-08-27
Payer: COMMERCIAL

## 2018-08-27 DIAGNOSIS — M25.551 HIP PAIN, RIGHT: ICD-10-CM

## 2018-08-27 DIAGNOSIS — M54.5 RIGHT LOW BACK PAIN, UNSPECIFIED CHRONICITY, WITH SCIATICA PRESENCE UNSPECIFIED: ICD-10-CM

## 2018-08-27 PROCEDURE — 97110 THERAPEUTIC EXERCISES: CPT | Mod: GP | Performed by: PHYSICAL THERAPIST

## 2018-08-27 PROCEDURE — 97140 MANUAL THERAPY 1/> REGIONS: CPT | Mod: GP | Performed by: PHYSICAL THERAPIST

## 2018-09-11 ENCOUNTER — THERAPY VISIT (OUTPATIENT)
Dept: PHYSICAL THERAPY | Facility: CLINIC | Age: 43
End: 2018-09-11
Payer: COMMERCIAL

## 2018-09-11 DIAGNOSIS — M25.551 HIP PAIN, RIGHT: ICD-10-CM

## 2018-09-11 DIAGNOSIS — M54.5 RIGHT LOW BACK PAIN, UNSPECIFIED CHRONICITY, WITH SCIATICA PRESENCE UNSPECIFIED: ICD-10-CM

## 2018-09-11 PROCEDURE — 97110 THERAPEUTIC EXERCISES: CPT | Mod: GP | Performed by: PHYSICAL THERAPIST

## 2018-09-11 PROCEDURE — 97140 MANUAL THERAPY 1/> REGIONS: CPT | Mod: GP | Performed by: PHYSICAL THERAPIST

## 2018-09-11 PROCEDURE — 97112 NEUROMUSCULAR REEDUCATION: CPT | Mod: GP | Performed by: PHYSICAL THERAPIST

## 2018-09-18 ENCOUNTER — THERAPY VISIT (OUTPATIENT)
Dept: PHYSICAL THERAPY | Facility: CLINIC | Age: 43
End: 2018-09-18
Payer: COMMERCIAL

## 2018-09-18 DIAGNOSIS — M54.5 RIGHT LOW BACK PAIN, UNSPECIFIED CHRONICITY, WITH SCIATICA PRESENCE UNSPECIFIED: ICD-10-CM

## 2018-09-18 DIAGNOSIS — M25.551 HIP PAIN, RIGHT: ICD-10-CM

## 2018-09-18 PROCEDURE — 97140 MANUAL THERAPY 1/> REGIONS: CPT | Mod: GP | Performed by: PHYSICAL THERAPIST

## 2018-09-18 PROCEDURE — 97112 NEUROMUSCULAR REEDUCATION: CPT | Mod: GP | Performed by: PHYSICAL THERAPIST

## 2018-09-18 PROCEDURE — 97110 THERAPEUTIC EXERCISES: CPT | Mod: GP | Performed by: PHYSICAL THERAPIST

## 2018-09-27 ENCOUNTER — RADIANT APPOINTMENT (OUTPATIENT)
Dept: GENERAL RADIOLOGY | Facility: CLINIC | Age: 43
End: 2018-09-27
Attending: NURSE PRACTITIONER
Payer: COMMERCIAL

## 2018-09-27 ENCOUNTER — OFFICE VISIT (OUTPATIENT)
Dept: FAMILY MEDICINE | Facility: CLINIC | Age: 43
End: 2018-09-27
Payer: COMMERCIAL

## 2018-09-27 VITALS
WEIGHT: 121 LBS | RESPIRATION RATE: 16 BRPM | BODY MASS INDEX: 22.49 KG/M2 | OXYGEN SATURATION: 100 % | DIASTOLIC BLOOD PRESSURE: 71 MMHG | HEART RATE: 65 BPM | SYSTOLIC BLOOD PRESSURE: 109 MMHG | TEMPERATURE: 97.8 F

## 2018-09-27 DIAGNOSIS — M25.551 HIP PAIN, RIGHT: Primary | ICD-10-CM

## 2018-09-27 DIAGNOSIS — M25.551 HIP PAIN, RIGHT: ICD-10-CM

## 2018-09-27 DIAGNOSIS — R53.83 FATIGUE, UNSPECIFIED TYPE: ICD-10-CM

## 2018-09-27 LAB
ALBUMIN SERPL-MCNC: 3.5 G/DL (ref 3.4–5)
ALP SERPL-CCNC: 57 U/L (ref 40–150)
ALT SERPL W P-5'-P-CCNC: 21 U/L (ref 0–50)
ANION GAP SERPL CALCULATED.3IONS-SCNC: 8 MMOL/L (ref 3–14)
AST SERPL W P-5'-P-CCNC: 24 U/L (ref 0–45)
BASOPHILS # BLD AUTO: 0 10E9/L (ref 0–0.2)
BASOPHILS NFR BLD AUTO: 0.5 %
BILIRUB SERPL-MCNC: 0.4 MG/DL (ref 0.2–1.3)
BUN SERPL-MCNC: 10 MG/DL (ref 7–30)
CALCIUM SERPL-MCNC: 8.9 MG/DL (ref 8.5–10.1)
CHLORIDE SERPL-SCNC: 103 MMOL/L (ref 94–109)
CO2 SERPL-SCNC: 24 MMOL/L (ref 20–32)
CREAT SERPL-MCNC: 0.77 MG/DL (ref 0.52–1.04)
DIFFERENTIAL METHOD BLD: NORMAL
EOSINOPHIL # BLD AUTO: 0.1 10E9/L (ref 0–0.7)
EOSINOPHIL NFR BLD AUTO: 1.8 %
ERYTHROCYTE [DISTWIDTH] IN BLOOD BY AUTOMATED COUNT: 12.6 % (ref 10–15)
GFR SERPL CREATININE-BSD FRML MDRD: 82 ML/MIN/1.7M2
GLUCOSE SERPL-MCNC: 83 MG/DL (ref 70–99)
HCT VFR BLD AUTO: 42.6 % (ref 35–47)
HGB BLD-MCNC: 13.8 G/DL (ref 11.7–15.7)
LYMPHOCYTES # BLD AUTO: 1.3 10E9/L (ref 0.8–5.3)
LYMPHOCYTES NFR BLD AUTO: 22.7 %
MCH RBC QN AUTO: 30.6 PG (ref 26.5–33)
MCHC RBC AUTO-ENTMCNC: 32.4 G/DL (ref 31.5–36.5)
MCV RBC AUTO: 95 FL (ref 78–100)
MONOCYTES # BLD AUTO: 0.6 10E9/L (ref 0–1.3)
MONOCYTES NFR BLD AUTO: 10 %
NEUTROPHILS # BLD AUTO: 3.7 10E9/L (ref 1.6–8.3)
NEUTROPHILS NFR BLD AUTO: 65 %
PLATELET # BLD AUTO: 338 10E9/L (ref 150–450)
POTASSIUM SERPL-SCNC: 4.2 MMOL/L (ref 3.4–5.3)
PROT SERPL-MCNC: 7.8 G/DL (ref 6.8–8.8)
RBC # BLD AUTO: 4.51 10E12/L (ref 3.8–5.2)
SODIUM SERPL-SCNC: 135 MMOL/L (ref 133–144)
TSH SERPL DL<=0.005 MIU/L-ACNC: 1.7 MU/L (ref 0.4–4)
WBC # BLD AUTO: 5.7 10E9/L (ref 4–11)

## 2018-09-27 PROCEDURE — 86618 LYME DISEASE ANTIBODY: CPT | Performed by: NURSE PRACTITIONER

## 2018-09-27 PROCEDURE — 99214 OFFICE O/P EST MOD 30 MIN: CPT | Performed by: NURSE PRACTITIONER

## 2018-09-27 PROCEDURE — 82306 VITAMIN D 25 HYDROXY: CPT | Performed by: NURSE PRACTITIONER

## 2018-09-27 PROCEDURE — 85025 COMPLETE CBC W/AUTO DIFF WBC: CPT | Performed by: NURSE PRACTITIONER

## 2018-09-27 PROCEDURE — 84443 ASSAY THYROID STIM HORMONE: CPT | Performed by: NURSE PRACTITIONER

## 2018-09-27 PROCEDURE — 73502 X-RAY EXAM HIP UNI 2-3 VIEWS: CPT

## 2018-09-27 PROCEDURE — 80053 COMPREHEN METABOLIC PANEL: CPT | Performed by: NURSE PRACTITIONER

## 2018-09-27 PROCEDURE — 36415 COLL VENOUS BLD VENIPUNCTURE: CPT | Performed by: NURSE PRACTITIONER

## 2018-09-27 NOTE — PROGRESS NOTES
"  SUBJECTIVE:   Chela Villatoro is a 43 year old female who presents to clinic today for the following health issues:  Chief Complaint   Patient presents with     tired     achy hips         Not feeling well. Symptoms: achy hips and tired since July.  Therapy tried: been to physical therapy several times in the last month - helps a little.       Hips ache. Right is worse than the left. Ortho eval a few years ago. Dr[facility] Perez [facility]  \"I threw out my back in July.\" she did go to physical therapy and her back is much better.  No fever chills.  Not sleeping great at night. Getting approximately 8 hours per night. Not restful due to right hip pain.    July: URI  One month later another URI.    She worked out yesterday. Her work out was shortened because she wasn't feeling okay     Problem list and histories reviewed & adjusted, as indicated.  Additional history: as documented    Patient Active Problem List   Diagnosis     Uses oral contraception     CARDIOVASCULAR SCREENING; LDL GOAL LESS THAN 160     Plantar warts     Right low back pain, unspecified chronicity, with sciatica presence unspecified     Hip pain, right     Past Surgical History:   Procedure Laterality Date     NO HISTORY OF SURGERY         Social History   Substance Use Topics     Smoking status: Never Smoker     Smokeless tobacco: Never Used     Alcohol use 0.5 oz/week      Comment: occasionally     Family History   Problem Relation Age of Onset     Neurologic Disorder Sister      HEART DISEASE Maternal Grandfather      Depression Paternal Aunt      Alcohol/Drug Paternal Uncle      Thyroid Disease Other      paternal cousin     Thyroid Disease Other          Current Outpatient Prescriptions   Medication Sig Dispense Refill     desogestrel-ethinyl estradiol (RECLIPSEN) 0.15-30 MG-MCG per tablet Take 1 tablet by mouth daily 84 tablet 2     Allergies   Allergen Reactions     Nkda [No Known Drug Allergies]      Recent Labs   Lab Test  02/02/18   " 1032  03/04/13   1556  12/20/12   0836   LDL  95   --   75   HDL  118   --   100   TRIG  117   --   143   TSH  1.12  0.65   --       BP Readings from Last 3 Encounters:   09/27/18 109/71   02/02/18 116/82   05/05/17 109/68    Wt Readings from Last 3 Encounters:   09/27/18 121 lb (54.9 kg)   02/02/18 124 lb (56.2 kg)   05/05/17 120 lb (54.4 kg)                  Labs reviewed in EPIC    Reviewed and updated as needed this visit by clinical staff  Tobacco  Allergies  Meds  Med Hx  Surg Hx  Fam Hx  Soc Hx      Reviewed and updated as needed this visit by Provider         ROS:  Constitutional, HEENT, cardiovascular, pulmonary, GI, , musculoskeletal, neuro, skin, endocrine and psych systems are negative, except as otherwise noted.    OBJECTIVE:     /71  Pulse 65  Temp 97.8  F (36.6  C) (Oral)  Resp 16  Wt 121 lb (54.9 kg)  SpO2 100%  BMI 22.49 kg/m2  Body mass index is 22.49 kg/(m^2).  Constitutional: healthy, alert and no distress  Neck: supple, no adenopathy  Cardiovascular: RRR. No murmurs, clicks gallops or rub  Respiratory: Respirations easy and regular. No respiratory distress noted. Lung sounds clear to auscultation.  Gastrointestinal: abdomen soft, nontender to light or deep palpation. Bowel sounds active in 4 quadrants. No hepatosplenomegaly. No rebound or guarding.  Neurologic: Gait normal. Reflexes normal and symmetric. Sensation grossly WNL.  Psychiatric: mentation appears normal and affect normal/bright    Right hip xray: normal  Labs: results pending    ASSESSMENT/PLAN:     (M25.551) Hip pain, right  (primary encounter diagnosis)  Comment: acute  Plan: XR Hip Right 2-3 Views, Comprehensive metabolic        panel, TSH with free T4 reflex, Vitamin D         Deficiency, CBC with platelets and         differential, Lyme Disease Francheska with reflex to         WB Serum, ORTHOPEDICS ADULT REFERRAL            (R53.83) Fatigue, unspecified type  Comment: acute  Plan: XR Hip Right 2-3 Views,  Comprehensive metabolic        panel, TSH with free T4 reflex, Vitamin D         Deficiency, CBC with platelets and         differential, Lyme Disease Francheska with reflex to         WB Serum          For now, I encouraged the patient to proceed with physical therapy for her right hip pain.  Be seen by ortho for additional treatment options/considerations (steroid injection?).  Healthy diet, fluids, rest, etc.  Okay to take OTC pain relievers.  She will be treated if her labs are abnormal.  Follow up with primary care if symptoms do not improve or with any worsening.       MANUEL Valera [facility]

## 2018-09-27 NOTE — MR AVS SNAPSHOT
After Visit Summary   9/27/2018    Chela Villatoro    MRN: [id number]           Patient Information     Date Of Birth          1975        Visit Information        Provider Department      9/27/2018 8:40 AM Elham Landis APRN CNP [facility]        Today's Diagnoses     Hip pain, right    -  1    Fatigue, unspecified type           Follow-ups after your visit        Additional Services     ORTHOPEDICS ADULT REFERRAL       Your provider has referred you to:   FMG: [facility] [place] [facility] [phone] [email or web address]  UM: Orthopaedic Clinic [facility] [phone]   [email or web address]    UMP: Sports Medicine Clinic [facility] [phone]   [email or web address]    Please be aware that coverage of these services is subject to the terms and limitations of your health insurance plan.  Call member services at your health plan with any benefit or coverage questions.      Please bring the following to your appointment:    >>   Any x-rays, CTs or MRIs which have been performed.  Contact the facility where they were done to arrange for  prior to your scheduled appointment.    >>   List of current medications   >>   This referral request   >>   Any documents/labs given to you for this referral                  Your next 10 appointments already scheduled     Sep 28, 2018  7:40 AM CDT   MyChart Short with MANUEL Duran CNP   [facility] ([facility])    [address] [facility] [address] [phone]              Who to contact     If you have questions or need follow up information about today's clinic visit or your schedule please contact [facility] directly at  [phone].  Normal or non-critical lab and imaging results will be communicated to you by MyChart, letter or phone within 4 business days after the clinic has received the results. If you do not hear from us within 7 days, please contact the clinic through [facility] or phone. If you have a critical or abnormal lab result, we will notify you by phone as soon as possible.  Submit refill requests through [employer] or call your pharmacy and they will forward the refill request to us. Please allow 3 business days for your refill to be completed.          Additional Information About Your Visit        [facility] Information     [employer] gives you secure access to your electronic health record. If you see a primary care provider, you can also send messages to your care team and make appointments. If you have questions, please call your primary care clinic.  If you do not have a primary care provider, please call [phone] and they will assist you.        Care EveryWhere ID     This is your Care EveryWhere ID. This could be used by other organizations to access your [facility] medical records  [id number]        Your Vitals Were     Pulse Temperature Respirations Pulse Oximetry BMI (Body Mass Index)       65 97.8  F (36.6  C) (Oral) 16 100% 22.49 kg/m2        Blood Pressure from Last 3 Encounters:   09/27/18 109/71   02/02/18 116/82   05/05/17 109/68    Weight from Last 3 Encounters:   09/27/18 121 lb (54.9 kg)   02/02/18 124 lb (56.2 kg)   05/05/17 120 lb (54.4 kg)              We Performed the Following     CBC with platelets and differential     Comprehensive metabolic panel     Lyme Disease Francheska with reflex to WB Serum     ORTHOPEDICS ADULT REFERRAL     TSH with free T4 reflex     Vitamin D Deficiency        Primary Care Provider Office Phone # Fax #    Susan MANUEL Clark [phone] [address] [facility] [address]        Equal Access to Services     JAY DAS AH: Tiago mccarthy  mojgan Dobson, paoda lulisacolin, yazminta kameme latif, guido mendez sergiokarl riyachikis ladaynelalito kaylin. So [facility] [phone].    ATENCIÓN: Si habla español, tiene a raman disposición servicios gratuitos de asistencia lingüística. Roldan al [phone].    We comply with applicable federal civil rights laws and [place] laws. We do not discriminate on the basis of race, color, national origin, age, disability, sex, sexual orientation, or gender identity.            Thank you!     Thank you for choosing [facility]  for your care. Our goal is always to provide you with excellent care. Hearing back from our patients is one way we can continue to improve our services. Please take a few minutes to complete the written survey that you may receive in the mail after your visit with us. Thank you!             Your Updated Medication List - Protect others around you: Learn how to safely use, store and throw away your medicines at www.disposemymeds.org.          This list is accurate as of 9/27/18  9:30 AM.  Always use your most recent med list.                   Brand Name Dispense Instructions for use Diagnosis    desogestrel-ethinyl estradiol 0.15-30 MG-MCG per tablet    RECLIPSEN    84 tablet    Take 1 tablet by mouth daily    Well woman exam with routine gynecological exam

## 2018-09-28 LAB
B BURGDOR IGG+IGM SER QL: 0.05 (ref 0–0.89)
DEPRECATED CALCIDIOL+CALCIFEROL SERPL-MC: 47 UG/L (ref 20–75)

## 2018-09-28 NOTE — PROGRESS NOTES
Federico York,    This note is to let you know that all of your test results look great/normal. Let me know if you have any questions.    Elham JACKSON CNP

## 2018-10-02 NOTE — PROGRESS NOTES
Federico York,    This note is to confirm to you that the radiologist did not see any acute changes on your hip x-ray.  Please take good care of yourself.  I hope things get better soon.    If there is anything else that I can do for you, please do not hesitate to let me know.    Elham JACKSON CNP

## 2018-11-26 ENCOUNTER — OFFICE VISIT (OUTPATIENT)
Dept: ORTHOPEDICS | Facility: CLINIC | Age: 43
End: 2018-11-26
Payer: COMMERCIAL

## 2018-11-26 VITALS
WEIGHT: 121 LBS | DIASTOLIC BLOOD PRESSURE: 74 MMHG | SYSTOLIC BLOOD PRESSURE: 115 MMHG | BODY MASS INDEX: 22.26 KG/M2 | HEIGHT: 62 IN

## 2018-11-26 DIAGNOSIS — M25.851 HIP IMPINGEMENT SYNDROME, RIGHT: Primary | ICD-10-CM

## 2018-11-26 PROCEDURE — 99214 OFFICE O/P EST MOD 30 MIN: CPT | Performed by: FAMILY MEDICINE

## 2018-11-26 NOTE — MR AVS SNAPSHOT
After Visit Summary   11/26/2018    Chela Villatoro    MRN: [id number]           Patient Information     Date Of Birth          1975        Visit Information        Provider Department      11/26/2018 9:20 AM Abel Perez MD  Sports Medicine        Today's Diagnoses     Hip impingement syndrome, right    -  1       Follow-ups after your visit        Additional Services     [facility] PT, HAND, AND CHIROPRACTIC REFERRAL       Physical Therapy, Hand Therapy and Chiropractic Care are available through:  *[facility]  *Hand Therapy (Occupational Therapy or Physical Therapy)  *[facility]    Call one number to schedule at any of the above locations: [phone].    Physical therapy, Hand therapy and/or Chiropractic care has been recommended by your physician as an excellent treatment option to reduce pain and help people return to normal activities, including sports.  Therapy and/or chiropractic care services are a great complement or alternative to expensive and invasive surgery, injections, or long-term use of prescription medications. The primary goal is to identify the underlying problem and provide you the tools to manage your condition on your own.     Please be aware that coverage of these services is subject to the terms and limitations of your health insurance plan.  Call member services at your health plan with any benefit or coverage questions.      Please bring the following to your appointment:  *Your personal calendar for scheduling future appointments  *Comfortable clothing                  Future tests that were ordered for you today     Open Future Orders        Priority Expected Expires Ordered    [facility] PT, HAND, AND CHIROPRACTIC REFERRAL Routine  11/26/2019 11/26/2018    MR Hip Right w/o Contrast Routine  11/26/2019 11/26/2018            Who to contact     If you have questions or need follow up information about today's clinic visit  "or your schedule please contact  SPORTS MEDICINE directly at [phone].  Normal or non-critical lab and imaging results will be communicated to you by MyChart, letter or phone within 4 business days after the clinic has received the results. If you do not hear from us within 7 days, please contact the clinic through [employer] | [employer]hart or phone. If you have a critical or abnormal lab result, we will notify you by phone as soon as possible.  Submit refill requests through [employer] or call your pharmacy and they will forward the refill request to us. Please allow 3 business days for your refill to be completed.          Additional Information About Your Visit        [employer] Information     [employer] gives you secure access to your electronic health record. If you see a primary care provider, you can also send messages to your care team and make appointments. If you have questions, please call your primary care clinic.  If you do not have a primary care provider, please call [phone] and they will assist you.        Care EveryWhere ID     This is your Care EveryWhere ID. This could be used by other organizations to access your [facility] medical records  [id number]        Your Vitals Were     Height BMI (Body Mass Index)                5' 1.5\" (1.562 m) 22.49 kg/m2           Blood Pressure from Last 3 Encounters:   11/26/18 115/74   09/27/18 109/71   02/02/18 116/82    Weight from Last 3 Encounters:   11/26/18 121 lb (54.9 kg)   09/27/18 121 lb (54.9 kg)   02/02/18 124 lb (56.2 kg)               Primary Care Provider Office Phone # Fax #    Susan Caroline Mireles, MANUEL [employer] [phone] [address] [facility] [address]        Equal Access to Services     [facility] Center[facility] AH: Hadbob Dobson, wajacindada luqadaha, qaybta kaalmada salomon, guido ewing. So [facility] [phone].    ATENCIÓN: Si habla español, tiene a raman disposición servicios gratuitos de asistencia " lingüística. Roldan al [phone].    We comply with applicable federal civil rights laws and [place] laws. We do not discriminate on the basis of race, color, national origin, age, disability, sex, sexual orientation, or gender identity.            Thank you!     Thank you for choosing  [facility]ICINE  for your care. Our goal is always to provide you with excellent care. Hearing back from our patients is one way we can continue to improve our services. Please take a few minutes to complete the written survey that you may receive in the mail after your visit with us. Thank you!             Your Updated Medication List - Protect others around you: Learn how to safely use, store and throw away your medicines at www.disposemymeds.org.          This list is accurate as of 11/26/18 11:33 AM.  Always use your most recent med list.                   Brand Name Dispense Instructions for use Diagnosis    desogestrel-ethinyl estradiol 0.15-30 MG-MCG per tablet    RECLIPSEN    84 tablet    Take 1 tablet by mouth daily    Well woman exam with routine gynecological exam

## 2018-11-26 NOTE — LETTER
11/26/2018         RE: Chela Villatoro  [address] [address]        Dear Colleague,    Thank you for referring your patient, Chela Villatoro, to the  SPORTS MEDICINE. Please see a copy of my visit note below.    [facility]   Clinic Visit s Nov 26, 2018    PCP: Susan Mireles Caroline York is a 43 year old female who is seen in consultation at the request of Dr. Landis for   Chief Complaint   Patient presents with     Right Hip - Pain       Injury: Reports insidious onset without acute precipitating event.      Location of Pain: right hip lateral, nonradiating   Duration of Pain: 4 month(s)  Rating of Pain at worst: 4/10  Rating of Pain Currently: 4/10  Pain is better with:  rest  Pain is worse with: sitting to standing, exercise, sleeping  Treatment so far consists of: physical therapy  Associated symptoms: no distal numbness or tingling; denies swelling or warmth  Recent imaging completed: X-rays completed sept 2018.  Prior History of related problems: very similar left hip pain 2016, resolved with PHYSICAL THERAPY then, current PHYSICAL THERAPY     Back injury this summer. Did PHYSICAL THERAPY for back, not for hip.     nbo new activity.    Social History: is employed as a/an       Past Medical History:   Diagnosis Date     ASCUS favor benign 1/2015    neg HPV Plan cotest in 3 yrs.     Wart viral 2011       Patient Active Problem List    Diagnosis Date Noted     Right low back pain, unspecified chronicity, with sciatica presence unspecified 08/15/2018     Priority: Medium     Hip pain, right 08/15/2018     Priority: Medium     Plantar warts 06/15/2011     Priority: Medium     CARDIOVASCULAR SCREENING; LDL GOAL LESS THAN 160 10/31/2010     Priority: Medium     Uses oral contraception 05/13/2010     Priority: Medium       Family History   Problem Relation Age of Onset     Neurologic Disorder Sister      HEART DISEASE Maternal Grandfather  "     Depression Paternal Aunt      Alcohol/Drug Paternal Uncle      Thyroid Disease Other      paternal cousin     Thyroid Disease Other        Social History     Social History     Marital status:      Spouse name: N/A     Number of children: 2     Years of education: N/A     Occupational History      [employer]     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   All other systems reviewed and are negative.        Physical Exam  /74  Ht 5' 1.5\" (1.562 m)  Wt 121 lb (54.9 kg)  BMI 22.49 kg/m2  Constitutional:well-developed, well-nourished, and in no distress.   Cardiovascular: Intact distal pulses.    Neurological: alert. Gait Normal:   Gait, station, stance, and balance appear normal for age  Skin: Skin is warm and dry.   Psychiatric: Mood and affect normal.   Respiratory: unlabored, speaks in full sentences  Lymph: no LAD, no lymphangitis      Left Hip Exam   Gait: Normal.    Right Hip Exam   Gait: Normal.    Tenderness   None    Range of Motion   Extension:            Normal  Flexion:                 Normal  Internal Rotation:  Abnormal  External Rotation: Normal  Abduction:            Normal  Adduction:            Normal    Muscle Strength   Abduction:  5/5  Adduction:  5/5  Flexion:      5/5    Tests   Golden: Positive  Lian:  N/t    Comments:  Pain with IR, FADIR          HIP RIGHT 2-3 VIEWS   9/27/2018 9:23 AM      HISTORY: Hip pain, right. Fatigue.     COMPARISON: Pelvic x-ray 11/3/2016.         IMPRESSION: No fracture is visualized. Right femoral head appears  normally aligned in the acetabulum. No significant loss of joint  space. There is a small bony fragment that appears to arise " from the  acetabulum that is presumably degenerative in etiology.     WONG DELGADO MD         ASSESSMENT/PLAN    ICD-10-CM    1. Hip impingement syndrome, right M25.851 MR Hip Right w/o Contrast     [facility], HAND, AND CHIROPRACTIC REFERRAL     Similar on exam to LEFT hip impingement which resolved with PHYSICAL THERAPY. Continued exercise including squats and lunges may have triggered new issue. Xr notable for bony acetabular fragment, unchanged from 2016 view, although there is now a superior acetabular tiny bony spur. Mechanical symptoms from os acetabuli may be playing a role. She in interested in returning to PHYSICAL THERAPY to see if the rehab routine that was successful for LEFT hip before needs to be modified.. In the meantime will pursue an MRI to see if bony edema present around os, or if OA present in hip. Avoid exercise involving deep hip flexion.     Again, thank you for allowing me to participate in the care of your patient.        Sincerely,        Abel Perez MD

## 2018-11-26 NOTE — PROGRESS NOTES
[facility]   Clinic Visit s Nov 26, 2018    PCP: Susan Mireles      Chela is a 43 year old female who is seen in consultation at the request of Dr. Landis for   Chief Complaint   Patient presents with     Right Hip - Pain       Injury: Reports insidious onset without acute precipitating event.      Location of Pain: right hip lateral, nonradiating   Duration of Pain: 4 month(s)  Rating of Pain at worst: 4/10  Rating of Pain Currently: 4/10  Pain is better with:  rest  Pain is worse with: sitting to standing, exercise, sleeping  Treatment so far consists of: physical therapy  Associated symptoms: no distal numbness or tingling; denies swelling or warmth  Recent imaging completed: X-rays completed sept 2018.  Prior History of related problems: very similar left hip pain 2016, resolved with PHYSICAL THERAPY then, current PHYSICAL THERAPY     Back injury this summer. Did PHYSICAL THERAPY for back, not for hip.     nbo new activity.    Social History: is employed as a/an       Past Medical History:   Diagnosis Date     ASCUS favor benign 1/2015    neg HPV Plan cotest in 3 yrs.     Wart viral 2011       Patient Active Problem List    Diagnosis Date Noted     Right low back pain, unspecified chronicity, with sciatica presence unspecified 08/15/2018     Priority: Medium     Hip pain, right 08/15/2018     Priority: Medium     Plantar warts 06/15/2011     Priority: Medium     CARDIOVASCULAR SCREENING; LDL GOAL LESS THAN 160 10/31/2010     Priority: Medium     Uses oral contraception 05/13/2010     Priority: Medium       Family History   Problem Relation Age of Onset     Neurologic Disorder Sister      HEART DISEASE Maternal Grandfather      Depression Paternal Aunt      Alcohol/Drug Paternal Uncle      Thyroid Disease Other      paternal cousin     Thyroid Disease Other        Social History     Social History     Marital status:      Spouse name: N/A     Number  "of children: 2     Years of education: N/A     Occupational History      [employer]     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   All other systems reviewed and are negative.        Physical Exam  /74  Ht 5' 1.5\" (1.562 m)  Wt 121 lb (54.9 kg)  BMI 22.49 kg/m2  Constitutional:well-developed, well-nourished, and in no distress.   Cardiovascular: Intact distal pulses.    Neurological: alert. Gait Normal:   Gait, station, stance, and balance appear normal for age  Skin: Skin is warm and dry.   Psychiatric: Mood and affect normal.   Respiratory: unlabored, speaks in full sentences  Lymph: no LAD, no lymphangitis      Left Hip Exam   Gait: Normal.    Right Hip Exam   Gait: Normal.    Tenderness   None    Range of Motion   Extension:            Normal  Flexion:                 Normal  Internal Rotation:  Abnormal  External Rotation: Normal  Abduction:            Normal  Adduction:            Normal    Muscle Strength   Abduction:  5/5  Adduction:  5/5  Flexion:      5/5    Tests   Golden: Positive  Lian:  N/t    Comments:  Pain with IR, FADIR          HIP RIGHT 2-3 VIEWS   9/27/2018 9:23 AM      HISTORY: Hip pain, right. Fatigue.     COMPARISON: Pelvic x-ray 11/3/2016.         IMPRESSION: No fracture is visualized. Right femoral head appears  normally aligned in the acetabulum. No significant loss of joint  space. There is a small bony fragment that appears to arise from the  acetabulum that is presumably degenerative in etiology.     WONG DELGADO MD         ASSESSMENT/PLAN    ICD-10-CM    1. Hip impingement syndrome, right M25.851 MR Hip Right w/o Contrast     [facility] PT, HAND, AND CHIROPRACTIC REFERRAL "     Similar on exam to LEFT hip impingement which resolved with PHYSICAL THERAPY. Continued exercise including squats and lunges may have triggered new issue. Xr notable for bony acetabular fragment, unchanged from 2016 view, although there is now a superior acetabular tiny bony spur. Mechanical symptoms from os acetabuli may be playing a role. She in interested in returning to PHYSICAL THERAPY to see if the rehab routine that was successful for LEFT hip before needs to be modified.. In the meantime will pursue an MRI to see if bony edema present around os, or if OA present in hip. Avoid exercise involving deep hip flexion.

## 2018-12-03 ENCOUNTER — HOSPITAL ENCOUNTER (OUTPATIENT)
Dept: MRI IMAGING | Facility: CLINIC | Age: 43
Discharge: HOME | End: 2018-12-03
Attending: FAMILY MEDICINE | Admitting: FAMILY MEDICINE
Payer: COMMERCIAL

## 2018-12-03 DIAGNOSIS — M25.851 HIP IMPINGEMENT SYNDROME, RIGHT: ICD-10-CM

## 2018-12-03 PROCEDURE — 73721 MRI JNT OF LWR EXTRE W/O DYE: CPT | Mod: RT

## 2018-12-10 NOTE — PROGRESS NOTES
[facility]   Follow-up Visit s Dec 12, 2018    PCP: Susan Mireles      Subjective:  Chela is a 43 year old female who is seen in follow up for evaluation of   Chief Complaint   Patient presents with     Right Hip - RECHECK     Her last visit was on 11/26/2018.  Since that time, symptoms have been unchanged. Chela Villatoro is accompanied today by self.       Patient had a hip MRI since last visit.      Patient has noticed a stable course of symptoms with imaging treatment.  Return to physical therapy tomorrow.  She has not been able to return to running and she has been avoiding deep hip flexion activities such as lunges and squats but pain persists.    Pain is located right hip, lateral, persistent.  Patient is using no aids.    Patient denies any new injuries.    Patient's past medical, surgical, social and family histories are reviewed today.    History from previous visit on 11/26/2018  Injury: Reports insidious onset without acute precipitating event.      Location of Pain: right hip lateral, nonradiating   Duration of Pain: 4 month(s)  Rating of Pain at worst: 4/10  Rating of Pain Currently: 4/10  Pain is better with:  rest  Pain is worse with: sitting to standing, exercise, sleeping  Treatment so far consists of: physical therapy  Associated symptoms: no distal numbness or tingling; denies swelling or warmth  Recent imaging completed: X-rays completed sept 2018.  Prior History of related problems: very similar left hip pain 2016, resolved with PHYSICAL THERAPY then, current PHYSICAL THERAPY     Back injury this summer. Did PHYSICAL THERAPY for back, not for hip.     nbo new activity.    Social History: is employed as a/an       Past Medical History:   Diagnosis Date     ASCUS favor benign 1/2015    neg HPV Plan cotest in 3 yrs.     Wart viral 2011       Patient Active Problem List    Diagnosis Date Noted     Right low back pain, unspecified chronicity, with  "sciatica presence unspecified 08/15/2018     Priority: Medium     Hip pain, right 08/15/2018     Priority: Medium     Plantar warts 06/15/2011     Priority: Medium     CARDIOVASCULAR SCREENING; LDL GOAL LESS THAN 160 10/31/2010     Priority: Medium     Uses oral contraception 05/13/2010     Priority: Medium       Family History   Problem Relation Age of Onset     Neurologic Disorder Sister      Heart Disease Maternal Grandfather      Depression Paternal Aunt      Alcohol/Drug Paternal Uncle      Thyroid Disease Other         paternal cousin     Thyroid Disease Other        Social History     Social History     Marital status:      Spouse name: N/A     Number of children: 2     Years of education: N/A     Occupational History      [place]     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   All other systems reviewed and are negative.        Physical Exam  /60   Ht 1.562 m (5' 1.5\")   Wt 54.9 kg (121 lb)   BMI 22.49 kg/m    Constitutional:well-developed, well-nourished, and in no distress.   Cardiovascular: Intact distal pulses.    Neurological: alert. Gait Normal:   Gait, station, stance, and balance appear normal for age  Skin: Skin is warm and dry.   Psychiatric: Mood and affect normal.   Respiratory: unlabored, speaks in full sentences  Lymph: no LAD, no lymphangitis      Left Hip Exam   Gait: Normal.    Right Hip Exam   Gait: Normal.    Tenderness   None    Range of Motion   Extension:            Normal  Flexion:                 Normal  Internal Rotation:  Abnormal  External Rotation: Normal  Abduction:            Normal  Adduction:            Normal    Muscle Strength   Abduction:  " 5/5  Adduction:  5/5  Flexion:      5/5    Tests   Golden: Positive  Lian:  N/t    Comments:  Pain with IR, FADIR      MR HIP RIGHT WITHOUT CONTRAST December 3, 2018 5:55 PM     HISTORY: Three months of anterior right hip pain. Evaluate for  impingement.     COMPARISON: Radiographs of the right hip on 9/27/2018.     TECHNIQUE: Multiplanar MR imaging was performed without contrast.     FINDINGS:      Osseous and Cartilaginous Structures: No fracture or osseous lesion is  seen. No femoral head osteonecrosis. No abnormal marrow signal  intensity is identified. No significant hip osteoarthritis or apparent  chondromalacia.     Acetabular Labrum: No juxta-acetabular cyst. No obvious labral tear is  appreciated. If indicated clinically, MR arthrography would be  considered the study of choice to evaluate the labrum.     Hip joint space: No significant joint effusion.      Trochanteric and Iliopsoas Bursae: No fluid collection in the  trochanteric or iliopsoas bursae.     Common Hamstring Tendon: No evidence of tear or significant  tendinosis.     Additional Findings: The muscles demonstrate normal signal intensity.  No adjacent soft tissues pathology is seen.                                                                      IMPRESSION: Unremarkable MRI of the right hip. Specifically, there is  no evidence of impingement.     ANTONIO GEORGE MD  No results found for this or any previous visit (from the past 48 hour(s)).  Recent Results (from the past 744 hour(s))   MR Hip Right w/o Contrast    Narrative    MR HIP RIGHT WITHOUT CONTRAST December 3, 2018 5:55 PM    HISTORY: Three months of anterior right hip pain. Evaluate for  impingement.    COMPARISON: Radiographs of the right hip on 9/27/2018.    TECHNIQUE: Multiplanar MR imaging was performed without contrast.    FINDINGS:     Osseous and Cartilaginous Structures: No fracture or osseous lesion is  seen. No femoral head osteonecrosis. No abnormal marrow  signal  intensity is identified. No significant hip osteoarthritis or apparent  chondromalacia.    Acetabular Labrum: No juxta-acetabular cyst. No obvious labral tear is  appreciated. If indicated clinically, MR arthrography would be  considered the study of choice to evaluate the labrum.    Hip joint space: No significant joint effusion.     Trochanteric and Iliopsoas Bursae: No fluid collection in the  trochanteric or iliopsoas bursae.    Common Hamstring Tendon: No evidence of tear or significant  tendinosis.    Additional Findings: The muscles demonstrate normal signal intensity.  No adjacent soft tissues pathology is seen.      Impression    IMPRESSION: Unremarkable MRI of the right hip. Specifically, there is  no evidence of impingement.    ANTONIO GEORGE MD       HIP RIGHT 2-3 VIEWS   9/27/2018 9:23 AM      HISTORY: Hip pain, right. Fatigue.     COMPARISON: Pelvic x-ray 11/3/2016.         IMPRESSION: No fracture is visualized. Right femoral head appears  normally aligned in the acetabulum. No significant loss of joint  space. There is a small bony fragment that appears to arise from the  acetabulum that is presumably degenerative in etiology.     WONG DELGADO MD         ASSESSMENT/PLAN    ICD-10-CM    1. Hip impingement syndrome, right M25.851 ORTHO  REFERRAL       MRI is unremarkable but the acetabular fragment is not discussed.  No edema is noted in that area by radiologist or upon my review of the actual MRI images.  This is somewhat reassuring but I believe she has other bony manifestations of femoral acetabular impingement with a prominence of the superior aspect of the femoral head and with the acetabular nodule.  Formal therapy is in order along with avoidance of deep hip flexion provocative activities, but will initiate referral to Dr. Sarmad Katz, anticipating that this can take 1-2 months sometimes.

## 2018-12-12 ENCOUNTER — OFFICE VISIT (OUTPATIENT)
Dept: ORTHOPEDICS | Facility: CLINIC | Age: 43
End: 2018-12-12
Payer: COMMERCIAL

## 2018-12-12 VITALS
DIASTOLIC BLOOD PRESSURE: 60 MMHG | SYSTOLIC BLOOD PRESSURE: 107 MMHG | WEIGHT: 121 LBS | BODY MASS INDEX: 22.26 KG/M2 | HEIGHT: 62 IN

## 2018-12-12 DIAGNOSIS — M25.851 HIP IMPINGEMENT SYNDROME, RIGHT: Primary | ICD-10-CM

## 2018-12-12 PROCEDURE — 99213 OFFICE O/P EST LOW 20 MIN: CPT | Performed by: FAMILY MEDICINE

## 2018-12-12 ASSESSMENT — MIFFLIN-ST. JEOR: SCORE: 1149.16

## 2018-12-12 NOTE — LETTER
12/12/2018         RE: Chela Villatoro  [address] [id number]        Dear Colleague,    Thank you for referring your patient, Chela Villatoro, to the  SPORTS MEDICINE. Please see a copy of my visit note below.    [facility]   Follow-up Visit s Dec 12, 2018    PCP: Susan Mireles      Subjective:  Chela is a 43 year old female who is seen in follow up for evaluation of   Chief Complaint   Patient presents with     Right Hip - RECHECK     Her last visit was on 11/26/2018.  Since that time, symptoms have been unchanged. Chela Villatoro is accompanied today by self.       Patient had a hip MRI since last visit.      Patient has noticed a stable course of symptoms with imaging treatment.  Return to physical therapy tomorrow.  She has not been able to return to running and she has been avoiding deep hip flexion activities such as lunges and squats but pain persists.    Pain is located right hip, lateral, persistent.  Patient is using no aids.    Patient denies any new injuries.    Patient's past medical, surgical, social and family histories are reviewed today.    History from previous visit on 11/26/2018  Injury: Reports insidious onset without acute precipitating event.      Location of Pain: right hip lateral, nonradiating   Duration of Pain: 4 month(s)  Rating of Pain at worst: 4/10  Rating of Pain Currently: 4/10  Pain is better with:  rest  Pain is worse with: sitting to standing, exercise, sleeping  Treatment so far consists of: physical therapy  Associated symptoms: no distal numbness or tingling; denies swelling or warmth  Recent imaging completed: X-rays completed sept 2018.  Prior History of related problems: very similar left hip pain 2016, resolved with PHYSICAL THERAPY then, current PHYSICAL THERAPY     Back injury this summer. Did PHYSICAL THERAPY for back, not for hip.     nbo new activity.    Social History: is employed as a/an    "    Past Medical History:   Diagnosis Date     ASCUS favor benign 1/2015    neg HPV Plan cotest in 3 yrs.     Wart viral 2011       Patient Active Problem List    Diagnosis Date Noted     Right low back pain, unspecified chronicity, with sciatica presence unspecified 08/15/2018     Priority: Medium     Hip pain, right 08/15/2018     Priority: Medium     Plantar warts 06/15/2011     Priority: Medium     CARDIOVASCULAR SCREENING; LDL GOAL LESS THAN 160 10/31/2010     Priority: Medium     Uses oral contraception 05/13/2010     Priority: Medium       Family History   Problem Relation Age of Onset     Neurologic Disorder Sister      Heart Disease Maternal Grandfather      Depression Paternal Aunt      Alcohol/Drug Paternal Uncle      Thyroid Disease Other         paternal cousin     Thyroid Disease Other        Social History     Social History     Marital status:      Spouse name: N/A     Number of children: 2     Years of education: N/A     Occupational History      [employer]     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   All other systems reviewed and are negative.        Physical Exam  /60   Ht 1.562 m (5' 1.5\")   Wt 54.9 kg (121 lb)   BMI 22.49 kg/m     Constitutional:well-developed, well-nourished, and in no distress.   Cardiovascular: Intact distal pulses.    Neurological: alert. Gait Normal:   Gait, station, stance, and balance appear normal for age  Skin: Skin is warm and dry.   Psychiatric: Mood and affect normal.   Respiratory: unlabored, speaks in full sentences  Lymph: no LAD, no lymphangitis      Left Hip Exam   Gait: Normal.    Right Hip Exam   Gait: Normal.    Tenderness "   None    Range of Motion   Extension:            Normal  Flexion:                 Normal  Internal Rotation:  Abnormal  External Rotation: Normal  Abduction:            Normal  Adduction:            Normal    Muscle Strength   Abduction:  5/5  Adduction:  5/5  Flexion:      5/5    Tests   Golden: Positive  Lian:  N/t    Comments:  Pain with IR, FADIR      MR HIP RIGHT WITHOUT CONTRAST December 3, 2018 5:55 PM     HISTORY: Three months of anterior right hip pain. Evaluate for  impingement.     COMPARISON: Radiographs of the right hip on 9/27/2018.     TECHNIQUE: Multiplanar MR imaging was performed without contrast.     FINDINGS:      Osseous and Cartilaginous Structures: No fracture or osseous lesion is  seen. No femoral head osteonecrosis. No abnormal marrow signal  intensity is identified. No significant hip osteoarthritis or apparent  chondromalacia.     Acetabular Labrum: No juxta-acetabular cyst. No obvious labral tear is  appreciated. If indicated clinically, MR arthrography would be  considered the study of choice to evaluate the labrum.     Hip joint space: No significant joint effusion.      Trochanteric and Iliopsoas Bursae: No fluid collection in the  trochanteric or iliopsoas bursae.     Common Hamstring Tendon: No evidence of tear or significant  tendinosis.     Additional Findings: The muscles demonstrate normal signal intensity.  No adjacent soft tissues pathology is seen.                                                                      IMPRESSION: Unremarkable MRI of the right hip. Specifically, there is  no evidence of impingement.     ANTONIO GEORGE MD  No results found for this or any previous visit (from the past 48 hour(s)).  Recent Results (from the past 744 hour(s))   MR Hip Right w/o Contrast    Narrative    MR HIP RIGHT WITHOUT CONTRAST December 3, 2018 5:55 PM    HISTORY: Three months of anterior right hip pain. Evaluate for  impingement.    COMPARISON: Radiographs of the right hip  on 9/27/2018.    TECHNIQUE: Multiplanar MR imaging was performed without contrast.    FINDINGS:     Osseous and Cartilaginous Structures: No fracture or osseous lesion is  seen. No femoral head osteonecrosis. No abnormal marrow signal  intensity is identified. No significant hip osteoarthritis or apparent  chondromalacia.    Acetabular Labrum: No juxta-acetabular cyst. No obvious labral tear is  appreciated. If indicated clinically, MR arthrography would be  considered the study of choice to evaluate the labrum.    Hip joint space: No significant joint effusion.     Trochanteric and Iliopsoas Bursae: No fluid collection in the  trochanteric or iliopsoas bursae.    Common Hamstring Tendon: No evidence of tear or significant  tendinosis.    Additional Findings: The muscles demonstrate normal signal intensity.  No adjacent soft tissues pathology is seen.      Impression    IMPRESSION: Unremarkable MRI of the right hip. Specifically, there is  no evidence of impingement.    ANTONIO GEORGE MD       HIP RIGHT 2-3 VIEWS   9/27/2018 9:23 AM      HISTORY: Hip pain, right. Fatigue.     COMPARISON: Pelvic x-ray 11/3/2016.         IMPRESSION: No fracture is visualized. Right femoral head appears  normally aligned in the acetabulum. No significant loss of joint  space. There is a small bony fragment that appears to arise from the  acetabulum that is presumably degenerative in etiology.     WONG DELGADO MD         ASSESSMENT/PLAN    ICD-10-CM    1. Hip impingement syndrome, right M25.851 ORTHO  REFERRAL       MRI is unremarkable but the acetabular fragment is not discussed.  No edema is noted in that area by radiologist or upon my review of the actual MRI images.  This is somewhat reassuring but I believe she has other bony manifestations of femoral acetabular impingement with a prominence of the superior aspect of the femoral head and with the acetabular nodule.  Formal therapy is in order along with avoidance of  deep hip flexion provocative activities, but will initiate referral to Dr. Sarmad Katz, anticipating that this can take 1-2 months sometimes.    Again, thank you for allowing me to participate in the care of your patient.        Sincerely,        Abel Perez MD

## 2018-12-13 ENCOUNTER — THERAPY VISIT (OUTPATIENT)
Dept: PHYSICAL THERAPY | Facility: CLINIC | Age: 43
End: 2018-12-13
Payer: COMMERCIAL

## 2018-12-13 DIAGNOSIS — M25.851 HIP IMPINGEMENT SYNDROME, RIGHT: ICD-10-CM

## 2018-12-13 PROCEDURE — 97110 THERAPEUTIC EXERCISES: CPT | Mod: GP | Performed by: PHYSICAL THERAPIST

## 2018-12-13 PROCEDURE — 97161 PT EVAL LOW COMPLEX 20 MIN: CPT | Mod: GP | Performed by: PHYSICAL THERAPIST

## 2018-12-13 ASSESSMENT — ACTIVITIES OF DAILY LIVING (ADL)
RECREATIONAL_ACTIVITIES: MODERATE DIFFICULTY
GOING_DOWN_1_FLIGHT_OF_STAIRS: NO DIFFICULTY AT ALL
HOS_ADL_ITEM_SCORE_TOTAL: 46
HEAVY_WORK: SLIGHT DIFFICULTY
HOS_ADL_COUNT: 16
WALKING_APPROXIMATELY_10_MINUTES: NO DIFFICULTY AT ALL
STEPPING_UP_AND_DOWN_CURBS: NO DIFFICULTY AT ALL
ROLLING_OVER_IN_BED: SLIGHT DIFFICULTY
WALKING_INITIALLY: SLIGHT DIFFICULTY
LIGHT_TO_MODERATE_WORK: SLIGHT DIFFICULTY
HOS_ADL_HIGHEST_POTENTIAL_SCORE: 64
HOS_ADL_SCORE(%): 71.88
STANDING_FOR_15_MINUTES: MODERATE DIFFICULTY
WALKING_UP_STEEP_HILLS: SLIGHT DIFFICULTY
GOING_UP_1_FLIGHT_OF_STAIRS: SLIGHT DIFFICULTY
WALKING_15_MINUTES_OR_GREATER: MODERATE DIFFICULTY
HOW_WOULD_YOU_RATE_YOUR_CURRENT_LEVEL_OF_FUNCTION_DURING_YOUR_USUAL_ACTIVITIES_OF_DAILY_LIVING_FROM_0_TO_100_WITH_100_BEING_YOUR_LEVEL_OF_FUNCTION_PRIOR_TO_YOUR_HIP_PROBLEM_AND_0_BEING_THE_INABILITY_TO_PERFORM_ANY_OF_YOUR_USUAL_DAILY_ACTIVITIES?: 80
GETTING_INTO_AND_OUT_OF_AN_AVERAGE_CAR: SLIGHT DIFFICULTY
WALKING_DOWN_STEEP_HILLS: SLIGHT DIFFICULTY
SITTING_FOR_15_MINUTES: SLIGHT DIFFICULTY
PUTTING_ON_SOCKS_AND_SHOES: SLIGHT DIFFICULTY
DEEP_SQUATTING: EXTREME DIFFICULTY
TWISTING/PIVOTING_ON_INVOLVED_LEG: SLIGHT DIFFICULTY

## 2018-12-13 NOTE — PROGRESS NOTES
[facility] Initial Evaluation  Subjective:  Physical Therapy Initial Evaluation   12/13/18   Precautions/Restrictions/MD instructions: PT eval and treat    Therapist Impression:   Pt is a 42 y/o female, with 3 month history of right hip pain. Pt presents with pain, decreased ROM/mobility, and decreased strength. These impairments limit their ability to squat, lunge, run, row and partake in wellness activities (exercising at gym). Skilled PT services necessary in order to reduce impairments and improve independent function.     Subjective:   Chief Complaint: R hip pain. Similar to L hip pain (which she had PT for 2 years ago and got better).   DOI/onset: 8/30/18 DOS: none  Location: R hip (anterolateral)  Quality: dull/ache  Frequency: intermittent  Radiates: none to note  Pain scale: Rest 1/10 Activity 9/10    Sleeping: wakes due to hip pain (hard to get comfortable)    Exacerbated by: running, squatting, rowing, stairs, sleeping, sit to stand Relieved by: rest, ice, NSAID's  Progression: better if rest  Previous Treatment: none for R hip Effect of prior treatment: n/a    PMH and/or surgical history: none   Imaging: MRI (negative)     Occupation:   Job duties: computer work, driving   Current HEP/exercise regimen: stopped orange theory; joined lifetime (elliptical)  Patient's goals: workout painfree  Medications: none  General health as reported by patient: good   Return to MD: as needed   Red Flags: none to note     HPI                    Objective:  HIP:     Gait: analgic; weight shift to left     Functional:   - squat: good form but painful (slight weight shift)        PROM: (* indicates patient's pain)    L  R   Flexion  120 90*    Extension 20  15    Abduction 45 40*    IR (supine 90-90) 40  15*    ER (supine 90-90) 50  50      Strength:    L R   HIP       Flex  5/5 4/5*   Ext 4+/5 4/5*   Add (0-45-90 degrees: supine) nt  nt   Abd 4/5  4-/5*      Special tests:    STEPHANY PENN  -  -   FADIR - ++      Palpation: very tender to R glute med and max          System    Physical Exam    General     ROS    Assessment/Plan:    Patient is a 43 year old female with right side hip complaints.    Patient has the following significant findings with corresponding treatment plan.                Diagnosis 1:  R hip pain  Pain -  hot/cold therapy, manual therapy, splint/taping/bracing/orthotics, self management, education, directional preference exercise and home program  Decreased ROM/flexibility - manual therapy and therapeutic exercise  Decreased joint mobility - manual therapy and therapeutic exercise  Decreased strength - therapeutic exercise and therapeutic activities  Inflammation - cold therapy and self management/home program  Impaired gait - gait training  Impaired muscle performance - neuro re-education  Decreased function - therapeutic activities  Instability -  Therapeutic Activity  Therapeutic Exercise    Therapy Evaluation Codes:   1) History comprised of:   Personal factors that impact the plan of care:      None.    Comorbidity factors that impact the plan of care are:      None.     Medications impacting care: Anti-inflammatory.  2) Examination of Body Systems comprised of:   Body structures and functions that impact the plan of care:      Hip.   Activity limitations that impact the plan of care are:      Driving, Running, Squatting/kneeling, Stairs, Working and Sleeping.  3) Clinical presentation characteristics are:   Stable/Uncomplicated.  4) Decision-Making    Low complexity using standardized patient assessment instrument and/or measureable assessment of functional outcome.  Cumulative Therapy Evaluation is: Low complexity.    Previous and current functional limitations:  (See Goal Flow Sheet for this information)    Short term and Long term goals: (See Goal Flow Sheet for this information)     Communication ability:  Patient appears to be able to clearly communicate and understand  verbal and written communication and follow directions correctly.  Treatment Explanation - The following has been discussed with the patient:   RX ordered/plan of care  Anticipated outcomes  Possible risks and side effects  This patient would benefit from PT intervention to resume normal activities.   Rehab potential is good.    Frequency:  1 X week, once daily  Duration:  for 6 weeks  Discharge Plan:  Achieve all LTG.  Independent in home treatment program.  Reach maximal therapeutic benefit.    Please refer to the daily flowsheet for treatment today, total treatment time and time spent performing 1:1 timed codes.

## 2018-12-13 NOTE — PROGRESS NOTES
Physical Therapy Initial Evaluation   12/13/18   Precautions/Restrictions/MD instructions: PT eval and treat    Therapist Impression:   Pt is a 42 y/o female, with 3 month history of right hip pain. Pt presents with pain, decreased ROM/mobility, and decreased strength. These impairments limit their ability to squat, lunge, run, row and partake in wellness activities (exercising at gym). Skilled PT services necessary in order to reduce impairments and improve independent function.    Subjective:   Chief Complaint: R hip pain. Similar to L hip pain (which she had PT for 2 years ago and got better).   DOI/onset: 8/30/18 DOS: none  Location: R hip *** Quality: ***  Frequency: *** Radiates: ***  Pain scale: Rest ***/10 Activity ***/10   Time of day: *** Sleeping: ***   Exacerbated by: *** Relieved by: *** Progression: ***  Previous Treatment: *** Effect of prior treatment: ***   PMH and/or surgical history: ***   Imaging: ***    Occupation: *** Job duties: ***   Current HEP/exercise regimen: *** Patient's goals: ***  Medications: ***  General health as reported by patient: ***  Return to MD: ***  Red Flags: ***    HIP:    Standing Posture: ***    Gait: ***    Functional:   - squat/SL squat      PROM: (* indicates patient's pain)   L  R   Flexion     Extension     Abduction     IR (supine 90-90)     ER (supine 90-90)       Strength:   L R   HIP     Flex     Ext     Add (0-45-90 degrees: supine)     Abd       Special tests:   L R   FILI     FADIR       Palpation: ***

## 2018-12-13 NOTE — PROGRESS NOTES
[facility] Initial Evaluation  Subjective:                                         Medical allergies: no.    Current medications:  Anti-inflammatory.    Employment status: .  Primary job tasks include:  Driving.                                Objective:  System    Physical Exam    General     ROS    Assessment/Plan:

## 2018-12-20 ENCOUNTER — THERAPY VISIT (OUTPATIENT)
Dept: PHYSICAL THERAPY | Facility: CLINIC | Age: 43
End: 2018-12-20
Payer: COMMERCIAL

## 2018-12-20 DIAGNOSIS — M25.551 HIP PAIN, RIGHT: Primary | ICD-10-CM

## 2018-12-20 PROCEDURE — 97140 MANUAL THERAPY 1/> REGIONS: CPT | Mod: GP | Performed by: PHYSICAL THERAPIST

## 2018-12-20 PROCEDURE — 97112 NEUROMUSCULAR REEDUCATION: CPT | Mod: GP | Performed by: PHYSICAL THERAPIST

## 2018-12-20 PROCEDURE — 97110 THERAPEUTIC EXERCISES: CPT | Mod: GP | Performed by: PHYSICAL THERAPIST

## 2019-01-08 ENCOUNTER — THERAPY VISIT (OUTPATIENT)
Dept: PHYSICAL THERAPY | Facility: CLINIC | Age: 44
End: 2019-01-08
Payer: COMMERCIAL

## 2019-01-08 DIAGNOSIS — M25.551 HIP PAIN, RIGHT: ICD-10-CM

## 2019-01-08 PROCEDURE — 97110 THERAPEUTIC EXERCISES: CPT | Mod: GP | Performed by: PHYSICAL THERAPIST

## 2019-01-08 PROCEDURE — 97140 MANUAL THERAPY 1/> REGIONS: CPT | Mod: GP | Performed by: PHYSICAL THERAPIST

## 2019-01-22 ENCOUNTER — THERAPY VISIT (OUTPATIENT)
Dept: PHYSICAL THERAPY | Facility: CLINIC | Age: 44
End: 2019-01-22
Payer: COMMERCIAL

## 2019-01-22 DIAGNOSIS — M25.551 HIP PAIN, RIGHT: ICD-10-CM

## 2019-01-22 PROCEDURE — 97140 MANUAL THERAPY 1/> REGIONS: CPT | Mod: GP | Performed by: PHYSICAL THERAPIST

## 2019-01-22 PROCEDURE — 97110 THERAPEUTIC EXERCISES: CPT | Mod: GP | Performed by: PHYSICAL THERAPIST

## 2019-01-22 ASSESSMENT — ACTIVITIES OF DAILY LIVING (ADL)
HOS_ADL_COUNT: 17
WALKING_INITIALLY: SLIGHT DIFFICULTY
ROLLING_OVER_IN_BED: SLIGHT DIFFICULTY
RECREATIONAL_ACTIVITIES: MODERATE DIFFICULTY
GOING_DOWN_1_FLIGHT_OF_STAIRS: SLIGHT DIFFICULTY
GOING_UP_1_FLIGHT_OF_STAIRS: SLIGHT DIFFICULTY
WALKING_APPROXIMATELY_10_MINUTES: NO DIFFICULTY AT ALL
HOS_ADL_SCORE(%): 79.41
HOS_ADL_HIGHEST_POTENTIAL_SCORE: 68
WALKING_UP_STEEP_HILLS: SLIGHT DIFFICULTY
STANDING_FOR_15_MINUTES: SLIGHT DIFFICULTY
WALKING_15_MINUTES_OR_GREATER: NO DIFFICULTY AT ALL
LIGHT_TO_MODERATE_WORK: NO DIFFICULTY AT ALL
GETTING_INTO_AND_OUT_OF_A_BATHTUB: NO DIFFICULTY AT ALL
GETTING_INTO_AND_OUT_OF_AN_AVERAGE_CAR: SLIGHT DIFFICULTY
STEPPING_UP_AND_DOWN_CURBS: NO DIFFICULTY AT ALL
SITTING_FOR_15_MINUTES: NO DIFFICULTY AT ALL
HOS_ADL_ITEM_SCORE_TOTAL: 54
TWISTING/PIVOTING_ON_INVOLVED_LEG: SLIGHT DIFFICULTY
DEEP_SQUATTING: MODERATE DIFFICULTY
HOW_WOULD_YOU_RATE_YOUR_CURRENT_LEVEL_OF_FUNCTION_DURING_YOUR_USUAL_ACTIVITIES_OF_DAILY_LIVING_FROM_0_TO_100_WITH_100_BEING_YOUR_LEVEL_OF_FUNCTION_PRIOR_TO_YOUR_HIP_PROBLEM_AND_0_BEING_THE_INABILITY_TO_PERFORM_ANY_OF_YOUR_USUAL_DAILY_ACTIVITIES?: 60
HEAVY_WORK: SLIGHT DIFFICULTY
PUTTING_ON_SOCKS_AND_SHOES: NO DIFFICULTY AT ALL
WALKING_DOWN_STEEP_HILLS: SLIGHT DIFFICULTY

## 2019-02-01 ENCOUNTER — OFFICE VISIT (OUTPATIENT)
Dept: FAMILY MEDICINE | Facility: CLINIC | Age: 44
End: 2019-02-01
Payer: COMMERCIAL

## 2019-02-01 VITALS — HEART RATE: 66 BPM | SYSTOLIC BLOOD PRESSURE: 115 MMHG | OXYGEN SATURATION: 100 % | DIASTOLIC BLOOD PRESSURE: 73 MMHG

## 2019-02-01 DIAGNOSIS — L81.4 LENTIGINES: ICD-10-CM

## 2019-02-01 DIAGNOSIS — Z80.8 FAMILY HISTORY OF BASAL CELL CARCINOMA (BCC): ICD-10-CM

## 2019-02-01 DIAGNOSIS — D22.9 MULTIPLE BENIGN NEVI: ICD-10-CM

## 2019-02-01 DIAGNOSIS — D18.01 CHERRY ANGIOMA: ICD-10-CM

## 2019-02-01 DIAGNOSIS — L82.0 INFLAMED SEBORRHEIC KERATOSIS: Primary | ICD-10-CM

## 2019-02-01 PROCEDURE — 17110 DESTRUCTION B9 LES UP TO 14: CPT | Performed by: PHYSICIAN ASSISTANT

## 2019-02-01 PROCEDURE — 99214 OFFICE O/P EST MOD 30 MIN: CPT | Mod: 25 | Performed by: PHYSICIAN ASSISTANT

## 2019-02-01 NOTE — PATIENT INSTRUCTIONS
Proper skin care from [facility]:    -Eliminate harsh soaps as they strip the natural oils from the skin, often resulting in dry itchy skin ( i.e. Dial, Zest, Elis Spring)  -Use mild soaps such as Cetaphil or Dove Sensitive Skin in the shower. You do not need to use soap on arms, legs, and trunk every time you shower unless visibly soiled.   -Avoid hot or cold showers.  -After showering, lightly dry off and apply moisturizing within 2-3 minutes. This will help trap moisture in the skin.   -Aggressive use of a moisturizer at least 1-2 times a day to the entire body (including -Vanicream, Cetaphil, Aquaphor or Cerave) and moisturize hands after every washing.  -We recommend using moisturizers that come in a tub that needs to be scooped out, not a pump. This has more of an oil base. It will hold moisture in your skin much better than a water base moisturizer. The above recommended are non-pore clogging.           Wear a sunscreen with at least SPF 30 on your face, ears, neck and V of the chest daily. Wear sunscreen on other areas of the body if those areas are exposed to the sun throughout the day. Sunscreens can contain physical and/or chemical blockers. Physical blockers are less likely to clog pores, these include zinc oxide and titanium dioxide. Reapply every two hour and after swimming. Sunscreen examples include Neutrogena, CeraVe, Blue Lizard, Elta MD and many others.    UV radiation  UVA radiation remains constant throughout the day and throughout the year. It is a longer wavelength than UVB and therefore penetrates deeper into the skin leading to immediate and delayed tanning, photoaging, and skin cancer. 70-80% of UVA and UVB radiation occurs between the hours of 10am-2pm.  UVB radiation  UVB radiation causes the most harmful effects and is more significant during the summer months. However, snow and ice can reflect UVB radiation leading to skin damage during the winter months as well. UVB  radiation is responsible for tanning, burning, inflammation, delayed erythema (pinkness), pigmentation (brown spots), and skin cancer.   Just because you do not burn or are not developing a tan does not mean that you are not damaging your skin. A 15 minute drive to and from work for 30 years an lead to chronic sun damage of the skin. It is important to wear a broad spectrum (both UVA and UVB) sunscreen EVERY day with at least 30 SPF. Apply to face, ears, neck and v of the chest as this is where most of our sun exposure is. Reapply sunscreen every two hours if you plan on being outside.   Benson Kapoor. Clinical Dermatology: A Color Guide to Diagnosis and Therapy. Elsevier, 2016.     WOUND CARE INSTRUCTIONS  FOR CRYOSURGERY        This area treated with liquid nitrogen will form a blister. You do not need to bandage the area until after the blister forms and breaks (which may be a few days).  When the blister breaks, begin daily dressing changes as follows:    1) Clean and dry the area with tap water using clean Q-tip or sterile gauze pad.    2) Apply Polysporin ointment or Bacitracin ointment over entire wound.  Do NOT use Neosporin ointment.    3) Cover the wound with a band-aid or sterile non-stick gauze pad and micropore paper tape.      REPEAT THESE INSTRUCTIONS AT LEAST ONCE A DAY UNTIL THE WOUND HAS COMPLETELY HEALED.        It is an old wives tale that a wound heals better when it is exposed to air and allowed to dry out. The wound will heal faster with a better cosmetic result if it is kept moist with ointment and covered with a bandage.  Do not let the wound dry out.      IMPORTANT INFORMATION ON REVERSE SIDE    Supplies Needed:     *Cotton tipped applicators (Q-tips)   *Polysporin ointment or Bacitracin ointment (NOT NEOSPORIN)   *Band-aids, or non stick gauze pads and micropore paper tape                PATIENT INFORMATION    During the healing process you will notice a number of changes. All wounds  develop a small halo of redness surrounding the wound.  This means healing is occurring. Severe itching with extensive redness usually indicates sensitivity to the ointment or bandage tape used to dress the wound.  You should call our office if this develops.      Swelling and/or discoloration around your surgical site is common, particularly when performed around the eye.    All wounds normally drain.  The larger the wound the more drainage there will be.  After 7-10 days, you will notice the wound beginning to shrink and new skin will begin to grow.  The wound is healed when you can see skin has formed over the entire area.  A healed wound has a healthy, shiny look to the surface and is red to dark pink in color to normalize.  Wounds may take approximately 4-6 weeks to heal.  Larger wounds may take 6-8 weeks.  After the wound is healed you may discontinue dressing changes.    You may experience a sensation of tightness as your wound heals. This is normal and will gradually subside.    Your healed wound may be sensitive to temperature changes. This sensitivity improves with time, but if you re having a lot of discomfort, try to avoid temperature extremes.    Patients frequently experience itching after their wound appears to have healed because of the continue healing under the skin.  Plain Vaseline will help relieve the itching.

## 2019-02-01 NOTE — LETTER
2/1/2019         RE: Chela Villatoro  [address] [facility] [id number]        Dear Colleague,    Thank you for referring your patient, Chela Villatoro, to the [facility] [place]. Please see a copy of my visit note below.    HPI:  Chela Villatoro is a 44 year old female patient here today for FBE. Pts mother has had multiple BCC. Pt has a new itchy spot on chest .  Patient states this has been present for 1 month.  Patient reports the following symptoms: itch .  Patient reports the following previous treatments: none.  Patient reports the following modifying factors: none.  Associated symptoms: none.  Patient has no other skin complaints today.  Remainder of the HPI, Meds, PMH, Allergies, FH, and SH was reviewed in chart.    Pertinent Hx:   Mother has had bcc. No personal history of skin cancer  Past Medical History:   Diagnosis Date     ASCUS favor benign 1/2015    neg HPV Plan cotest in 3 yrs.     Wart viral 2011       Past Surgical History:   Procedure Laterality Date     NO HISTORY OF SURGERY          Family History   Problem Relation Age of Onset     Neurologic Disorder Sister      Heart Disease Maternal Grandfather      Basal cell carcinoma Mother      Depression Paternal Aunt      Alcohol/Drug Paternal Uncle      Thyroid Disease Other         paternal cousin     Thyroid Disease Other        Social History     Socioeconomic History     Marital status:      Spouse name: Not on file     Number of children: 2     Years of education: Not on file     Highest education level: Not on file   Social Needs     Financial resource strain: Not on file     Food insecurity - worry: Not on file     Food insecurity - inability: Not on file     Transportation needs - medical: Not on file     Transportation needs - non-medical: Not on file   Occupational History     Occupation:      Employer: [employer]     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 No   Social History Narrative    Caffeine intake/servings daily - 1    Calcium intake/servings daily - 1-2    Exercise 3 times weekly - describe walking, running    Sunscreen used - Yes    Seatbelts used - Yes    Guns stored in the home - No    Self Breast Exam - occ    Pap test up to date -  Last pap 5/13/2010    Eye exam up to date -  Yes    Dental exam up to date -  Yes    DEXA scan up to date -  Not Applicable    Flex Sig/Colonoscopy up to date -  Not Applicable    Mammography up to date -  Not Applicable    Immunizations reviewed and up to date - tdap 2008. Flu shot already    Abuse: Current or Past (Physical, Sexual or Emotional) - No    Do you feel safe in your environment - Yes    Do you cope well with stress - Yes    Do you suffer from insomnia - No    Last updated by: Shruti Eason MA.                               Outpatient Encounter Medications as of 2/1/2019   Medication Sig Dispense Refill     desogestrel-ethinyl estradiol (RECLIPSEN) 0.15-30 MG-MCG per tablet Take 1 tablet by mouth daily 84 tablet 2     No facility-administered encounter medications on file as of 2/1/2019.        Review Of Systems:  Skin: As above  Eyes: negative  Ears/Nose/Throat: negative  Respiratory: No shortness of breath, dyspnea on exertion, cough, or hemoptysis  Cardiovascular: negative  Gastrointestinal: negative  Genitourinary: negative  Musculoskeletal: negative  Neurologic: negative  Psychiatric: negative  Hematologic/Lymphatic/Immunologic: negative  Endocrine: negative      Objective:     /73   Pulse 66   SpO2 100%   Eyes: Conjunctivae/lids: Normal   ENT: Lips:  Normal  MSK:  Normal  Cardiovascular: Peripheral edema none  Pulm: Breathing Normal  Neuro/Psych: Orientation: Normal; Mood/Affect: Normal, NAD, WDWN  Pt accompanied by: self  Following areas examined: Scalp, face, eyelids, lips, neck, chest, abdomen, back, buttocks, and R&L upper and lower extremities.  Adrian skin type:i   Findings:  Red smooth well-defined macules on trunk and extremities.  Tan WD smooth macules on face, neck, trunk, and extremities.  Well circumscribed macules with symmetric color distribution on trunk and extremities 2-3mm  Pink scaly stuck on appearing papule on left chest  Assessment and Plan:  1) Lentigines, multiple benign nevi, cherry angiomas    Treatment of these lesions would be purely cosmetic and not medically neccessary.  Lesion may recur and/or may not completely resolve. May need additional treatment.  Different removal options including excision, cryotherapy, cautery and /or laser.      2) favor ISK  Disc biopsy vs cryo.   Pt elects cryo and will follow up in 1 month  LN2: Treated with LN2 for 5s for 1-2 cycles. Warned risks of blistering, pain, pigment change, scarring, and incomplete resolution.  Advised patient to return if lesions do not completely resolve within 2-3 months.  Wound care sheet given.    3) family history of skin cancer  Signs and Symptoms of non-melanoma skin cancer and ABCDEs of melanoma reviewed with patient. Patient encouraged to perform monthly self skin exams and educated on how to perform them. UV precautions reviewed with patient. Patient was asked about new or changing moles/lesions on body.   Wear a sunscreen with at least SPF 30 on your face, ears, neck and V of the chest daily. Wear sunscreen on other areas of the body if those areas are exposed to the sun throughout the day. Sunscreens can contain physical and/or chemical blockers. Physical blockers are less likely to clog pores, these include zinc oxide and titanium dioxide. Reapply every two hour and after  swimming. Sunscreen examples include Neutrogena, CeraVe, Blue Lizard, Elta MD and many others.    Proper skin care from [facility]:    -Eliminate harsh soaps as they strip the natural oils from the skin, often resulting in dry itchy skin ( i.e. Dial, Zest, [place] Spring)  -Use mild soaps such as Cetaphil or Dove Sensitive Skin in the shower. You do not need to use soap on arms, legs, and trunk every time you shower unless visibly soiled.   -Avoid hot or cold showers.  -After showering, lightly dry off and apply moisturizing within 2-3 minutes. This will help trap moisture in the skin.   -Aggressive use of a moisturizer at least 1-2 times a day to the entire body (including -Vanicream, Cetaphil, Aquaphor or Cerave) and moisturize hands after every washing.  -We recommend using moisturizers that come in a tub that needs to be scooped out, not a pump. This has more of an oil base. It will hold moisture in your skin much better than a water base moisturizer. The above recommended are non-pore clogging.       Follow up in 1 month. Yearly for FBE.       Again, thank you for allowing me to participate in the care of your patient.        Sincerely,        Estee Huang PA-C

## 2019-02-01 NOTE — PROGRESS NOTES
HPI:  Chela Villatoro is a 44 year old female patient here today for FBE. Pts mother has had multiple BCC. Pt has a new itchy spot on chest .  Patient states this has been present for 1 month.  Patient reports the following symptoms: itch .  Patient reports the following previous treatments: none.  Patient reports the following modifying factors: none.  Associated symptoms: none.  Patient has no other skin complaints today.  Remainder of the HPI, Meds, PMH, Allergies, FH, and SH was reviewed in chart.    Pertinent Hx:   Mother has had bcc. No personal history of skin cancer  Past Medical History:   Diagnosis Date     ASCUS favor benign 1/2015    neg HPV Plan cotest in 3 yrs.     Wart viral 2011       Past Surgical History:   Procedure Laterality Date     NO HISTORY OF SURGERY          Family History   Problem Relation Age of Onset     Neurologic Disorder Sister      Heart Disease Maternal Grandfather      Basal cell carcinoma Mother      Depression Paternal Aunt      Alcohol/Drug Paternal Uncle      Thyroid Disease Other         paternal cousin     Thyroid Disease Other        Social History     Socioeconomic History     Marital status:      Spouse name: Not on file     Number of children: 2     Years of education: Not on file     Highest education level: Not on file   Social Needs     Financial resource strain: Not on file     Food insecurity - worry: Not on file     Food insecurity - inability: Not on file     Transportation needs - medical: Not on file     Transportation needs - non-medical: Not on file   Occupational History     Occupation:      Employer: [employer]     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 No   Social History Narrative    Caffeine intake/servings daily - 1    Calcium intake/servings daily - 1-2    Exercise 3 times weekly - describe walking, running    Sunscreen used - Yes    Seatbelts used - Yes    Guns stored in the home - No    Self Breast Exam - occ    Pap test up to date -  Last pap 5/13/2010    Eye exam up to date -  Yes    Dental exam up to date -  Yes    DEXA scan up to date -  Not Applicable    Flex Sig/Colonoscopy up to date -  Not Applicable    Mammography up to date -  Not Applicable    Immunizations reviewed and up to date - tdap 2008. Flu shot already    Abuse: Current or Past (Physical, Sexual or Emotional) - No    Do you feel safe in your environment - Yes    Do you cope well with stress - Yes    Do you suffer from insomnia - No    Last updated by: Shruti Eason MA.                               Outpatient Encounter Medications as of 2/1/2019   Medication Sig Dispense Refill     desogestrel-ethinyl estradiol (RECLIPSEN) 0.15-30 MG-MCG per tablet Take 1 tablet by mouth daily 84 tablet 2     No facility-administered encounter medications on file as of 2/1/2019.        Review Of Systems:  Skin: As above  Eyes: negative  Ears/Nose/Throat: negative  Respiratory: No shortness of breath, dyspnea on exertion, cough, or hemoptysis  Cardiovascular: negative  Gastrointestinal: negative  Genitourinary: negative  Musculoskeletal: negative  Neurologic: negative  Psychiatric: negative  Hematologic/Lymphatic/Immunologic: negative  Endocrine: negative      Objective:     /73   Pulse 66   SpO2 100%   Eyes: Conjunctivae/lids: Normal   ENT: Lips:  Normal  MSK: Normal  Cardiovascular: Peripheral edema none  Pulm: Breathing Normal  Neuro/Psych: Orientation: Normal; Mood/Affect: Normal, NAD, WDWN  Pt accompanied by: self  Following areas examined: Scalp, face, eyelids, lips, neck, chest, abdomen, back, buttocks, and R&L  upper and lower extremities.  Adrian skin type:i   Findings:  Red smooth well-defined macules on trunk and extremities.  Tan WD smooth macules on face, neck, trunk, and extremities.  Well circumscribed macules with symmetric color distribution on trunk and extremities 2-3mm  Pink scaly stuck on appearing papule on left chest  Assessment and Plan:  1) Lentigines, multiple benign nevi, cherry angiomas    Treatment of these lesions would be purely cosmetic and not medically neccessary.  Lesion may recur and/or may not completely resolve. May need additional treatment.  Different removal options including excision, cryotherapy, cautery and /or laser.      2) favor ISK  Disc biopsy vs cryo.   Pt elects cryo and will follow up in 1 month  LN2: Treated with LN2 for 5s for 1-2 cycles. Warned risks of blistering, pain, pigment change, scarring, and incomplete resolution.  Advised patient to return if lesions do not completely resolve within 2-3 months.  Wound care sheet given.    3) family history of skin cancer  Signs and Symptoms of non-melanoma skin cancer and ABCDEs of melanoma reviewed with patient. Patient encouraged to perform monthly self skin exams and educated on how to perform them. UV precautions reviewed with patient. Patient was asked about new or changing moles/lesions on body.   Wear a sunscreen with at least SPF 30 on your face, ears, neck and V of the chest daily. Wear sunscreen on other areas of the body if those areas are exposed to the sun throughout the day. Sunscreens can contain physical and/or chemical blockers. Physical blockers are less likely to clog pores, these include zinc oxide and titanium dioxide. Reapply every two hour and after swimming. Sunscreen examples include Neutrogena CeraVe, Blue Lizard, Elta MD and many others.    Proper skin care from [facility]:    -Eliminate harsh soaps as they strip the natural oils from the skin, often resulting in dry itchy skin ( i.e. Dial,  Zest, [place] Spring)  -Use mild soaps such as Cetaphil or Dove Sensitive Skin in the shower. You do not need to use soap on arms, legs, and trunk every time you shower unless visibly soiled.   -Avoid hot or cold showers.  -After showering, lightly dry off and apply moisturizing within 2-3 minutes. This will help trap moisture in the skin.   -Aggressive use of a moisturizer at least 1-2 times a day to the entire body (including -Vanicream, Cetaphil, Aquaphor or Cerave) and moisturize hands after every washing.  -We recommend using moisturizers that come in a tub that needs to be scooped out, not a pump. This has more of an oil base. It will hold moisture in your skin much better than a water base moisturizer. The above recommended are non-pore clogging.       Follow up in 1 month. Yearly for FBE.

## 2019-02-04 NOTE — PROGRESS NOTES
Chela is a 44 year old  female who presents for annual exam.     Besides routine health maintenance, she has no other health concerns today .  Menses are regular q 28-30 days and normal lasting 4 days.   Menses flow: light.    Patient's last menstrual period was 2019 (within days).  Using oral contraceptives for contraception. Would like to think about switching contraceptive methods to Mirena, as she has had one before.   She is not currently considering pregnancy.  Chela and her fiance and their children recently sold both of their houses and moved into one new house; planning a wedding this summer!    REPRODUCTIVE/GYNECOLOGIC HISTORY:  Chela is sexually active with male partner(s) and is currently in monogamous relationship.   STI testing offered?  Declined  History of abnormal Pap smear:  Yes, ASCUS with negative HPV in 2015. Negative cotest in 2018.  SOCIAL HISTORY  Abuse: does not report having previously been physical or sexually abused.    Do you feel safe in your environment? YES    She  reports that  has never smoked. she has never used smokeless tobacco.      Last PHQ-9 score on record =   PHQ-9 SCORE 2019   PHQ-9 Total Score -   PHQ-9 Total Score 0     Last GAD7 score on record =   ALMAS-7 SCORE 2019   Total Score -   Total Score 0     Alcohol Score = 3    HEALTH MAINTENANCE:  Cholesterol:  18   Total= 236, Triglycerides=117, SRT=643, LDL=95, FBS=68   History of abnormal lipids: No  Mammo: 18 . History of abnormal Mammo: No.  Regular Self Breast Exams: No  TSH:  TSH   Date Value Ref Range Status   2018 1.70 0.40 - 4.00 mU/L Final      Pap; HPV: negative  Lab Results   Component Value Date    PAP NIL 2018    PAP ASC-US 2015    PAP NIL 2011      Immunizations up to date: yes  (Gardasil, Tdap, Flu)  Health maintenance updated:  yes    HEALTHY HABITS  Eating habits: eats regular meals and follows a balanced nutrition diet.  Could eat more  vegetables.  Calcium/Vitamin D intake: source:  Some dairy Adequate? Probably not. Recommended supplementation.  Exercise: How often do you exercise? 3x per week; Recently had a hip injury (hip impingement syndrome), has been going to PT, does what she can at the gym while she's healing.  Have you had an eye exam in the last two years? NO (Recommended)  Do you routinely see the dentist once or twice yearly? YES      HISTORY:  Obstetric History       T2      L2     SAB0   TAB0   Ectopic0   Multiple0   Live Births2       # Outcome Date GA Lbr Maykel/2nd Weight Sex Delivery Anes PTL Lv   2 Term 08/15/04    M    MATTHIAS   1 Term 02    F    MATTHIAS        Past Medical History:   Diagnosis Date     ASCUS favor benign 2015    neg HPV Plan cotest in 3 yrs.     Wart viral      Past Surgical History:   Procedure Laterality Date     NO HISTORY OF SURGERY       Family History   Problem Relation Age of Onset     Neurologic Disorder Sister      Heart Disease Maternal Grandfather      Basal cell carcinoma Mother      Depression Paternal Aunt      Alcohol/Drug Paternal Uncle      Thyroid Disease Other         paternal cousin     Thyroid Disease Other      Social History     Socioeconomic History     Marital status:      Spouse name: Not on file     Number of children: 2     Years of education: Not on file     Highest education level: Not on file   Social Needs     Financial resource strain: Not on file     Food insecurity - worry: Not on file     Food insecurity - inability: Not on file     Transportation needs - medical: Not on file     Transportation needs - non-medical: Not on file   Occupational History     Occupation:      Employer: [employer]     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 No   Social History Narrative    Caffeine intake/servings daily - 1    Calcium intake/servings daily - 1-2    Exercise 3 times weekly - describe walking, running    Sunscreen used - Yes    Seatbelts used - Yes    Guns stored in the home - No    Self Breast Exam - occ    Pap test up to date -  Last pap 5/13/2010    Eye exam up to date -  Yes    Dental exam up to date -  Yes    DEXA scan up to date -  Not Applicable    Flex Sig/Colonoscopy up to date -  Not Applicable    Mammography up to date -  Not Applicable    Immunizations reviewed and up to date - tdap 2008. Flu shot already    Abuse: Current or Past (Physical, Sexual or Emotional) - No    Do you feel safe in your environment - Yes    Do you cope well with stress - Yes    Do you suffer from insomnia - No    Last updated by: Shruti Eason MA.                               Current Outpatient Medications:      desogestrel-ethinyl estradiol (RECLIPSEN) 0.15-30 MG-MCG tablet, Take 1 tablet by mouth daily, Disp: 84 tablet, Rfl: 1     Allergies   Allergen Reactions     Nkda [No Known Drug Allergies]        Past medical, surgical, social and family history were reviewed and updated in EPIC.    ROS:   12 point review of systems negative other than symptoms noted below.  Musculoskeletal: Joint Pain    PHYSICAL EXAM:  /62 (BP Location: Right arm, Patient Position: Sitting, Cuff Size: Adult Regular)   Pulse 60   Ht 1.57 m (5' 1.8\")   Wt 56.8 kg (125 lb 3.2 oz)   LMP 01/14/2019 (Within Days)   Breastfeeding? No   BMI 23.05 kg/m     BMI: Body mass index is 23.05 kg/m .  Constitutional: healthy, alert and no distress  Neck: symmetrical, thyroid normal size, no masses present, no lymphadenopathy present.   Cardiovascular: RRR, no murmurs present  Respiratory: breathing unlabored, lungs CTA " bilaterally  Breast: normal without masses, tenderness or nipple discharge and no palpable axillary masses or adenopathy  Gastrointestinal: abdomen soft, non-tender, bowel sounds present  PELVIC EXAM:  Vulva: No lesions, no adenopathy, BUS WNL  Vagina: Moist, pink  Cervix: smooth, pink, negative CMT  Uterus: Normal size, non-tender, mobile  Ovaries: No masses palpated  Rectal exam: deferred    ASSESSMENT/PLAN:    ICD-10-CM    1. Encounter for gynecological examination without abnormal finding Z01.419    2. Uses oral contraception Z30.41 desogestrel-ethinyl estradiol (RECLIPSEN) 0.15-30 MG-MCG tablet         COUNSELING:   Reviewed preventive health counseling, as reflected in patient instructions  Special attention given to:        Regular exercise       Healthy diet/nutrition       Vision screening       Contraception. Chela is considering getting another Mirena (had one previously), as she is tired of taking pills each day.  Provided pamphlet and advised Chela to make an appointment the first day of her menses if she would like one placed. Rx renewal sent to pharmacy for OCPs.       Osteoporosis Prevention/Bone Health  Having a 3D mammogram today.   reports that  has never smoked. she has never used smokeless tobacco.      Trudy Esquivel, DNP, APRN, CNM

## 2019-02-05 ENCOUNTER — THERAPY VISIT (OUTPATIENT)
Dept: PHYSICAL THERAPY | Facility: CLINIC | Age: 44
End: 2019-02-05
Payer: COMMERCIAL

## 2019-02-05 DIAGNOSIS — M25.551 HIP PAIN, RIGHT: ICD-10-CM

## 2019-02-05 PROCEDURE — 97140 MANUAL THERAPY 1/> REGIONS: CPT | Mod: GP | Performed by: PHYSICAL THERAPIST

## 2019-02-05 PROCEDURE — 97112 NEUROMUSCULAR REEDUCATION: CPT | Mod: GP | Performed by: PHYSICAL THERAPIST

## 2019-02-05 PROCEDURE — 97110 THERAPEUTIC EXERCISES: CPT | Mod: GP | Performed by: PHYSICAL THERAPIST

## 2019-02-08 ENCOUNTER — ANCILLARY PROCEDURE (OUTPATIENT)
Dept: MAMMOGRAPHY | Facility: CLINIC | Age: 44
End: 2019-02-08
Payer: COMMERCIAL

## 2019-02-08 ENCOUNTER — OFFICE VISIT (OUTPATIENT)
Dept: MIDWIFE SERVICES | Facility: CLINIC | Age: 44
End: 2019-02-08
Payer: COMMERCIAL

## 2019-02-08 VITALS
WEIGHT: 125.2 LBS | SYSTOLIC BLOOD PRESSURE: 100 MMHG | BODY MASS INDEX: 23.04 KG/M2 | HEART RATE: 60 BPM | HEIGHT: 62 IN | DIASTOLIC BLOOD PRESSURE: 62 MMHG

## 2019-02-08 DIAGNOSIS — Z12.31 VISIT FOR SCREENING MAMMOGRAM: ICD-10-CM

## 2019-02-08 DIAGNOSIS — Z01.419 WELL WOMAN EXAM WITH ROUTINE GYNECOLOGICAL EXAM: ICD-10-CM

## 2019-02-08 DIAGNOSIS — Z01.419 ENCOUNTER FOR GYNECOLOGICAL EXAMINATION WITHOUT ABNORMAL FINDING: Primary | ICD-10-CM

## 2019-02-08 DIAGNOSIS — Z30.41 USES ORAL CONTRACEPTION: ICD-10-CM

## 2019-02-08 PROCEDURE — 77063 BREAST TOMOSYNTHESIS BI: CPT | Mod: TC

## 2019-02-08 PROCEDURE — 99396 PREV VISIT EST AGE 40-64: CPT | Performed by: ADVANCED PRACTICE MIDWIFE

## 2019-02-08 PROCEDURE — 77067 SCR MAMMO BI INCL CAD: CPT | Mod: TC

## 2019-02-08 RX ORDER — DESOGESTREL AND ETHINYL ESTRADIOL 0.15-0.03
1 KIT ORAL DAILY
Qty: 84 TABLET | Refills: 1 | Status: SHIPPED | OUTPATIENT
Start: 2019-02-08 | End: 2019-05-28

## 2019-02-08 ASSESSMENT — ANXIETY QUESTIONNAIRES
2. NOT BEING ABLE TO STOP OR CONTROL WORRYING: NOT AT ALL
3. WORRYING TOO MUCH ABOUT DIFFERENT THINGS: NOT AT ALL
7. FEELING AFRAID AS IF SOMETHING AWFUL MIGHT HAPPEN: NOT AT ALL
IF YOU CHECKED OFF ANY PROBLEMS ON THIS QUESTIONNAIRE, HOW DIFFICULT HAVE THESE PROBLEMS MADE IT FOR YOU TO DO YOUR WORK, TAKE CARE OF THINGS AT HOME, OR GET ALONG WITH OTHER PEOPLE: NOT DIFFICULT AT ALL
5. BEING SO RESTLESS THAT IT IS HARD TO SIT STILL: NOT AT ALL
GAD7 TOTAL SCORE: 0
1. FEELING NERVOUS, ANXIOUS, OR ON EDGE: NOT AT ALL
6. BECOMING EASILY ANNOYED OR IRRITABLE: NOT AT ALL

## 2019-02-08 ASSESSMENT — MIFFLIN-ST. JEOR: SCORE: 1167.98

## 2019-02-08 ASSESSMENT — PATIENT HEALTH QUESTIONNAIRE - PHQ9
SUM OF ALL RESPONSES TO PHQ QUESTIONS 1-9: 0
5. POOR APPETITE OR OVEREATING: NOT AT ALL

## 2019-02-09 ASSESSMENT — ANXIETY QUESTIONNAIRES: GAD7 TOTAL SCORE: 0

## 2019-03-01 ENCOUNTER — OFFICE VISIT (OUTPATIENT)
Dept: FAMILY MEDICINE | Facility: CLINIC | Age: 44
End: 2019-03-01
Payer: COMMERCIAL

## 2019-03-01 VITALS — DIASTOLIC BLOOD PRESSURE: 62 MMHG | OXYGEN SATURATION: 99 % | SYSTOLIC BLOOD PRESSURE: 110 MMHG | HEART RATE: 58 BPM

## 2019-03-01 DIAGNOSIS — L82.0 INFLAMED SEBORRHEIC KERATOSIS: Primary | ICD-10-CM

## 2019-03-01 PROCEDURE — 17110 DESTRUCTION B9 LES UP TO 14: CPT | Performed by: PHYSICIAN ASSISTANT

## 2019-03-01 NOTE — PROGRESS NOTES
HPI:  Chela Villatoro is a 44 year old female patient here today for follow up ISK on chest. LOV cryo with almost complete resolutio .  Patient states this has been present for a while.  Patient reports the following symptoms: scaly .  Patient reports the following modifying factors: none.  Associated symptoms: none.  Patient has no other skin complaints today.  Remainder of the HPI, Meds, PMH, Allergies, FH, and SH was reviewed in chart.    Pertinent Hx:   Pts mother has had multiple BCC  Past Medical History:   Diagnosis Date     ASCUS favor benign 1/2015    neg HPV Plan cotest in 3 yrs.     Wart viral 2011       Past Surgical History:   Procedure Laterality Date     NO HISTORY OF SURGERY          Family History   Problem Relation Age of Onset     Neurologic Disorder Sister      Heart Disease Maternal Grandfather      Basal cell carcinoma Mother      Depression Paternal Aunt      Alcohol/Drug Paternal Uncle      Thyroid Disease Other         paternal cousin     Thyroid Disease Other        Social History     Socioeconomic History     Marital status:      Spouse name: Not on file     Number of children: 2     Years of education: Not on file     Highest education level: Not on file   Occupational History     Occupation:      Employer: [employer]     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   Social Needs     Financial resource strain: Not on file     Food insecurity:     Worry: Not on file     Inability: Not on file     Transportation needs:     Medical: Not on file     Non-medical: Not on file   Tobacco Use     Smoking status: Never Smoker     Smokeless tobacco: Never Used   Substance and Sexual Activity     Alcohol use: Yes     Alcohol/week: 0.5 oz     Comment: occasionally     Drug use: No     Sexual activity: Yes     Partners: Male     Birth control/protection: Condom, Pill   Lifestyle     Physical activity:     Days per week: Not on file     Minutes per session: Not on file     Stress: Not on file    Relationships     Social connections:     Talks on phone: Not on file     Gets together: Not on file     Attends Alevism service: Not on file     Active member of club or organization: Not on file     Attends meetings of clubs or organizations: Not on file     Relationship status: Not on file     Intimate partner violence:     Fear of current or ex partner: Not on file     Emotionally abused: Not on file     Physically abused: Not on file     Forced sexual activity: Not on file   Other Topics Concern     Parent/sibling w/ CABG, MI or angioplasty before 65F 55M? No   Social History Narrative    Caffeine intake/servings daily - 1    Calcium intake/servings daily - 1-2    Exercise 3 times weekly - describe walking, running    Sunscreen used - Yes    Seatbelts used - Yes    Guns stored in the home - No    Self Breast Exam - occ    Pap test up to date -  Last pap 5/13/2010    Eye exam up to date -  Yes    Dental exam up to date -  Yes    DEXA scan up to date -  Not Applicable    Flex Sig/Colonoscopy up to date -  Not Applicable    Mammography up to date -  Not Applicable    Immunizations reviewed and up to date - tdap 2008. Flu shot already    Abuse: Current or Past (Physical, Sexual or Emotional) - No    Do you feel safe in your environment - Yes    Do you cope well with stress - Yes    Do you suffer from insomnia - No    Last updated by: Shruti Eason MA.                               Outpatient Encounter Medications as of 3/1/2019   Medication Sig Dispense Refill     desogestrel-ethinyl estradiol (RECLIPSEN) 0.15-30 MG-MCG tablet Take 1 tablet by mouth daily 84 tablet 1     No facility-administered encounter medications on file as of 3/1/2019.        Review Of Systems:  Skin: As above  Eyes: negative  Ears/Nose/Throat: negative  Respiratory: No shortness of breath, dyspnea on exertion, cough, or hemoptysis  Cardiovascular: negative  Gastrointestinal: negative  Genitourinary: negative  Musculoskeletal:  negative  Neurologic: negative  Psychiatric: negative  Hematologic/Lymphatic/Immunologic: negative  Endocrine: negative      Objective:     /62   Pulse 58   SpO2 99%   Eyes: Conjunctivae/lids: Normal   ENT: Lips:  Normal  MSK: Normal  Cardiovascular: Peripheral edema none  Pulm: Breathing Normal  Neuro/Psych: Orientation: Normal; Mood/Affect: Normal, NAD, WDWN  Pt accompanied by: self  Following areas examined: face, neck, chest  Adrian skin type:i   Findings:  Annular smooth pink macule with minimal scale centrally  Assessment and Plan:  1) favor ISK vs Lichenoid keratosis doubt SCC  Discussed biopsy vs ln2. Pt elects ln2  LN2: Treated with LN2 for 5s for 1-2 cycles. Warned risks of blistering, pain, pigment change, scarring, and incomplete resolution.  Advised patient to return if lesions do not completely resolve within 2-3 months.  Wound care sheet given.      Follow up in 1 month. If not resolved then recommend biopsy.

## 2019-03-01 NOTE — LETTER
3/1/2019         RE: [place]atoro  [address] [facility] [address]        Dear Colleague,    Thank you for referring your patient, Chela Villatoro, to the [facility] [place]. Please see a copy of my visit note below.    HPI:  Chela Villatoro is a 44 year old female patient here today for follow up ISK on chest. LOV cryo with almost complete resolutio .  Patient states this has been present for a while.  Patient reports the following symptoms: scaly .  Patient reports the following modifying factors: none.  Associated symptoms: none.  Patient has no other skin complaints today.  Remainder of the HPI, Meds, PMH, Allergies, FH, and SH was reviewed in chart.    Pertinent Hx:   Pts mother has had multiple BCC  Past Medical History:   Diagnosis Date     ASCUS favor benign 1/2015    neg HPV Plan cotest in 3 yrs.     Wart viral 2011       Past Surgical History:   Procedure Laterality Date     NO HISTORY OF SURGERY          Family History   Problem Relation Age of Onset     Neurologic Disorder Sister      Heart Disease Maternal Grandfather      Basal cell carcinoma Mother      Depression Paternal Aunt      Alcohol/Drug Paternal Uncle      Thyroid Disease Other         paternal cousin     Thyroid Disease Other        Social History     Socioeconomic History     Marital status:      Spouse name: Not on file     Number of children: 2     Years of education: Not on file     Highest education level: Not on file   Occupational History     Occupation:      Employer: [employer]     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   Social Needs     Financial resource strain: Not on file     Food insecurity:     Worry: Not on file     Inability: Not on file     Transportation needs:     Medical: Not on file     Non-medical: Not on file   Tobacco Use     Smoking status: Never Smoker     Smokeless tobacco: Never Used   Substance and Sexual Activity     Alcohol use: Yes     Alcohol/week: 0.5 oz     Comment:  occasionally     Drug use: No     Sexual activity: Yes     Partners: Male     Birth control/protection: Condom, Pill   Lifestyle     Physical activity:     Days per week: Not on file     Minutes per session: Not on file     Stress: Not on file   Relationships     Social connections:     Talks on phone: Not on file     Gets together: Not on file     Attends Denominational service: Not on file     Active member of club or organization: Not on file     Attends meetings of clubs or organizations: Not on file     Relationship status: Not on file     Intimate partner violence:     Fear of current or ex partner: Not on file     Emotionally abused: Not on file     Physically abused: Not on file     Forced sexual activity: Not on file   Other Topics Concern     Parent/sibling w/ CABG, MI or angioplasty before 65F 55M? No   Social History Narrative    Caffeine intake/servings daily - 1    Calcium intake/servings daily - 1-2    Exercise 3 times weekly - describe walking, running    Sunscreen used - Yes    Seatbelts used - Yes    Guns stored in the home - No    Self Breast Exam - occ    Pap test up to date -  Last pap 5/13/2010    Eye exam up to date -  Yes    Dental exam up to date -  Yes    DEXA scan up to date -  Not Applicable    Flex Sig/Colonoscopy up to date -  Not Applicable    Mammography up to date -  Not Applicable    Immunizations reviewed and up to date - tdap 2008. Flu shot already    Abuse: Current or Past (Physical, Sexual or Emotional) - No    Do you feel safe in your environment - Yes    Do you cope well with stress - Yes    Do you suffer from insomnia - No    Last updated by: Shruti Eason MA.                               Outpatient Encounter Medications as of 3/1/2019   Medication Sig Dispense Refill     desogestrel-ethinyl estradiol (RECLIPSEN) 0.15-30 MG-MCG tablet Take 1 tablet by mouth daily 84 tablet 1     No facility-administered encounter medications on file as of 3/1/2019.        Review Of  Systems:  Skin: As above  Eyes: negative  Ears/Nose/Throat: negative  Respiratory: No shortness of breath, dyspnea on exertion, cough, or hemoptysis  Cardiovascular: negative  Gastrointestinal: negative  Genitourinary: negative  Musculoskeletal: negative  Neurologic: negative  Psychiatric: negative  Hematologic/Lymphatic/Immunologic: negative  Endocrine: negative      Objective:     /62   Pulse 58   SpO2 99%   Eyes: Conjunctivae/lids: Normal   ENT: Lips:  Normal  MSK: Normal  Cardiovascular: Peripheral edema none  Pulm: Breathing Normal  Neuro/Psych: Orientation: Normal; Mood/Affect: Normal, NAD, WDWN  Pt accompanied by: self  Following areas examined: face, neck, chest  Adrian skin type:i   Findings:  Annular smooth pink macule with minimal scale centrally  Assessment and Plan:  1) favor ISK vs Lichenoid keratosis doubt SCC  Discussed biopsy vs ln2. Pt elects ln2  LN2: Treated with LN2 for 5s for 1-2 cycles. Warned risks of blistering, pain, pigment change, scarring, and incomplete resolution.  Advised patient to return if lesions do not completely resolve within 2-3 months.  Wound care sheet given.      Follow up in 1 month. If not resolved then recommend biopsy.       Again, thank you for allowing me to participate in the care of your patient.        Sincerely,        Estee Huang PA-C

## 2019-03-01 NOTE — PATIENT INSTRUCTIONS
Proper skin care from [facility]:    -Eliminate harsh soaps as they strip the natural oils from the skin, often resulting in dry itchy skin ( i.e. Dial, Zest, Elis Spring)  -Use mild soaps such as Cetaphil or Dove Sensitive Skin in the shower. You do not need to use soap on arms, legs, and trunk every time you shower unless visibly soiled.   -Avoid hot or cold showers.  -After showering, lightly dry off and apply moisturizing within 2-3 minutes. This will help trap moisture in the skin.   -Aggressive use of a moisturizer at least 1-2 times a day to the entire body (including -Vanicream, Cetaphil, Aquaphor or Cerave) and moisturize hands after every washing.  -We recommend using moisturizers that come in a tub that needs to be scooped out, not a pump. This has more of an oil base. It will hold moisture in your skin much better than a water base moisturizer. The above recommended are non-pore clogging.           Wear a sunscreen with at least SPF 30 on your face, ears, neck and V of the chest daily. Wear sunscreen on other areas of the body if those areas are exposed to the sun throughout the day. Sunscreens can contain physical and/or chemical blockers. Physical blockers are less likely to clog pores, these include zinc oxide and titanium dioxide. Reapply every two hour and after swimming. Sunscreen examples include Neutrogena, CeraVe, Blue Lizard, Elta MD and many others.    UV radiation  UVA radiation remains constant throughout the day and throughout the year. It is a longer wavelength than UVB and therefore penetrates deeper into the skin leading to immediate and delayed tanning, photoaging, and skin cancer. 70-80% of UVA and UVB radiation occurs between the hours of 10am-2pm.  UVB radiation  UVB radiation causes the most harmful effects and is more significant during the summer months. However, snow and ice can reflect UVB radiation leading to skin damage during the winter months as well. UVB  radiation is responsible for tanning, burning, inflammation, delayed erythema (pinkness), pigmentation (brown spots), and skin cancer.   Just because you do not burn or are not developing a tan does not mean that you are not damaging your skin. A 15 minute drive to and from work for 30 years an lead to chronic sun damage of the skin. It is important to wear a broad spectrum (both UVA and UVB) sunscreen EVERY day with at least 30 SPF. Apply to face, ears, neck and v of the chest as this is where most of our sun exposure is. Reapply sunscreen every two hours if you plan on being outside.   Benson Kapoor. Clinical Dermatology: A Color Guide to Diagnosis and Therapy. Elsevier, 2016.   WOUND CARE INSTRUCTIONS  FOR CRYOSURGERY        This area treated with liquid nitrogen will form a blister. You do not need to bandage the area until after the blister forms and breaks (which may be a few days).  When the blister breaks, begin daily dressing changes as follows:    1) Clean and dry the area with tap water using clean Q-tip or sterile gauze pad.    2) Apply Polysporin ointment or Bacitracin ointment over entire wound.  Do NOT use Neosporin ointment.    3) Cover the wound with a band-aid or sterile non-stick gauze pad and micropore paper tape.      REPEAT THESE INSTRUCTIONS AT LEAST ONCE A DAY UNTIL THE WOUND HAS COMPLETELY HEALED.        It is an old wives tale that a wound heals better when it is exposed to air and allowed to dry out. The wound will heal faster with a better cosmetic result if it is kept moist with ointment and covered with a bandage.  Do not let the wound dry out.      IMPORTANT INFORMATION ON REVERSE SIDE    Supplies Needed:     *Cotton tipped applicators (Q-tips)   *Polysporin ointment or Bacitracin ointment (NOT NEOSPORIN)   *Band-aids, or non stick gauze pads and micropore paper tape                PATIENT INFORMATION    During the healing process you will notice a number of changes. All wounds  develop a small halo of redness surrounding the wound.  This means healing is occurring. Severe itching with extensive redness usually indicates sensitivity to the ointment or bandage tape used to dress the wound.  You should call our office if this develops.      Swelling and/or discoloration around your surgical site is common, particularly when performed around the eye.    All wounds normally drain.  The larger the wound the more drainage there will be.  After 7-10 days, you will notice the wound beginning to shrink and new skin will begin to grow.  The wound is healed when you can see skin has formed over the entire area.  A healed wound has a healthy, shiny look to the surface and is red to dark pink in color to normalize.  Wounds may take approximately 4-6 weeks to heal.  Larger wounds may take 6-8 weeks.  After the wound is healed you may discontinue dressing changes.    You may experience a sensation of tightness as your wound heals. This is normal and will gradually subside.    Your healed wound may be sensitive to temperature changes. This sensitivity improves with time, but if you re having a lot of discomfort, try to avoid temperature extremes.    Patients frequently experience itching after their wound appears to have healed because of the continue healing under the skin.  Plain Vaseline will help relieve the itching.

## 2019-04-11 ENCOUNTER — OFFICE VISIT (OUTPATIENT)
Dept: FAMILY MEDICINE | Facility: CLINIC | Age: 44
End: 2019-04-11
Payer: COMMERCIAL

## 2019-04-11 VITALS — SYSTOLIC BLOOD PRESSURE: 98 MMHG | DIASTOLIC BLOOD PRESSURE: 64 MMHG | HEART RATE: 61 BPM | OXYGEN SATURATION: 100 %

## 2019-04-11 DIAGNOSIS — L81.0 POST-INFLAMMATORY HYPERPIGMENTATION: Primary | ICD-10-CM

## 2019-04-11 PROCEDURE — 99212 OFFICE O/P EST SF 10 MIN: CPT | Performed by: PHYSICIAN ASSISTANT

## 2019-04-11 NOTE — PATIENT INSTRUCTIONS
Watch and monitor area. If recurrence please follow up.     Wear a sunscreen with at least SPF 30 on your face, ears, neck and V of the chest daily. Wear sunscreen on other areas of the body if those areas are exposed to the sun throughout the day. Sunscreens can contain physical and/or chemical blockers. Physical blockers are less likely to clog pores, these include zinc oxide and titanium dioxide. Reapply every two hour and after swimming. Sunscreen examples include Neutrogena, CeraVe, Blue Lizard, Elta MD and many others.    UV radiation  UVA radiation remains constant throughout the day and throughout the year. It is a longer wavelength than UVB and therefore penetrates deeper into the skin leading to immediate and delayed tanning, photoaging, and skin cancer. 70-80% of UVA and UVB radiation occurs between the hours of 10am-2pm.  UVB radiation  UVB radiation causes the most harmful effects and is more significant during the summer months. However, snow and ice can reflect UVB radiation leading to skin damage during the winter months as well. UVB radiation is responsible for tanning, burning, inflammation, delayed erythema (pinkness), pigmentation (brown spots), and skin cancer.

## 2019-04-11 NOTE — LETTER
4/11/2019         RE: Chela Villatoro  [address] [facility] [id number]        Dear Colleague,    Thank you for referring your patient, Chela Villatoro, to the [facility] [place]. Please see a copy of my visit note below.    HPI:  Chela Villatoro is a 44 year old female patient here today for recheck ISK on chest .  Patient states this has been present for a while.  Patient reports the following symptoms: was irritated. Now resolved per pt .  Patient reports the following previous treatments: ln2.  Patient reports the following modifying factors: none.  Associated symptoms: none.  Patient has no other skin complaints today.  Remainder of the HPI, Meds, PMH, Allergies, FH, and SH was reviewed in chart.    Pertinent Hx:   Pts mother has had multiple BCC    Past Medical History:   Diagnosis Date     ASCUS favor benign 1/2015    neg HPV Plan cotest in 3 yrs.     Wart viral 2011       Past Surgical History:   Procedure Laterality Date     NO HISTORY OF SURGERY          Family History   Problem Relation Age of Onset     Neurologic Disorder Sister      Heart Disease Maternal Grandfather      Basal cell carcinoma Mother      Depression Paternal Aunt      Alcohol/Drug Paternal Uncle      Thyroid Disease Other         paternal cousin     Thyroid Disease Other        Social History     Socioeconomic History     Marital status:      Spouse name: Not on file     Number of children: 2     Years of education: Not on file     Highest education level: Not on file   Occupational History     Occupation:      Employer: [employer]     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   Social Needs     Financial resource strain: Not on file     Food insecurity:     Worry: Not on file     Inability: Not on file     Transportation needs:     Medical: Not on file     Non-medical: Not on file   Tobacco Use     Smoking status: Never Smoker     Smokeless tobacco: Never Used   Substance and Sexual Activity     Alcohol  use: Yes     Alcohol/week: 0.5 oz     Comment: occasionally     Drug use: No     Sexual activity: Yes     Partners: Male     Birth control/protection: Condom, Pill   Lifestyle     Physical activity:     Days per week: Not on file     Minutes per session: Not on file     Stress: Not on file   Relationships     Social connections:     Talks on phone: Not on file     Gets together: Not on file     Attends Faith service: Not on file     Active member of club or organization: Not on file     Attends meetings of clubs or organizations: Not on file     Relationship status: Not on file     Intimate partner violence:     Fear of current or ex partner: Not on file     Emotionally abused: Not on file     Physically abused: Not on file     Forced sexual activity: Not on file   Other Topics Concern     Parent/sibling w/ CABG, MI or angioplasty before 65F 55M? No   Social History Narrative    Caffeine intake/servings daily - 1    Calcium intake/servings daily - 1-2    Exercise 3 times weekly - describe walking, running    Sunscreen used - Yes    Seatbelts used - Yes    Guns stored in the home - No    Self Breast Exam - occ    Pap test up to date -  Last pap 5/13/2010    Eye exam up to date -  Yes    Dental exam up to date -  Yes    DEXA scan up to date -  Not Applicable    Flex Sig/Colonoscopy up to date -  Not Applicable    Mammography up to date -  Not Applicable    Immunizations reviewed and up to date - tdap 2008. Flu shot already    Abuse: Current or Past (Physical, Sexual or Emotional) - No    Do you feel safe in your environment - Yes    Do you cope well with stress - Yes    Do you suffer from insomnia - No    Last updated by: Shruti Eason MA.                               Outpatient Encounter Medications as of 4/11/2019   Medication Sig Dispense Refill     desogestrel-ethinyl estradiol (RECLIPSEN) 0.15-30 MG-MCG tablet Take 1 tablet by mouth daily 84 tablet 1     No facility-administered encounter medications on  file as of 4/11/2019.        Review Of Systems:  Skin: As above  Eyes: negative  Ears/Nose/Throat: negative  Respiratory: No shortness of breath, dyspnea on exertion, cough, or hemoptysis  Cardiovascular: negative  Gastrointestinal: negative  Genitourinary: negative  Musculoskeletal: negative  Neurologic: negative  Psychiatric: negative  Hematologic/Lymphatic/Immunologic: negative  Endocrine: negative      Objective:     BP 98/64   Pulse 61   SpO2 100%   Eyes: Conjunctivae/lids: Normal   ENT: Lips:  Normal  MSK: Normal  Cardiovascular: Peripheral edema none  Pulm: Breathing Normal  Neuro/Psych: Orientation: Normal; Mood/Affect: Normal, NAD, WDWN  Pt accompanied by: self  Following areas examined: face and chest  Adrian skin type:i   Findings:  Light brown/pink/hypopigmented smooth macule on chest  Assessment and Plan:  1) PIH  ISK resolved.  Disc PIH. Should resolve with time.  Signs and Symptoms of non-melanoma skin cancer and ABCDEs of melanoma reviewed with patient. Patient encouraged to perform monthly self skin exams and educated on how to perform them. UV precautions reviewed with patient. Patient was asked about new or changing moles/lesions on body.   Wear a sunscreen with at least SPF 30 on your face, ears, neck and V of the chest daily. Wear sunscreen on other areas of the body if those areas are exposed to the sun throughout the day. Sunscreens can contain physical and/or chemical blockers. Physical blockers are less likely to clog pores, these include zinc oxide and titanium dioxide. Reapply every two hour and after swimming. Sunscreen examples include Neutrogena, CeraVe, Blue Lizard, Elta MD and many others.      Follow up in yearly FBE      Again, thank you for allowing me to participate in the care of your patient.        Sincerely,        Estee Huang PA-C

## 2019-04-11 NOTE — PROGRESS NOTES
HPI:  Chela Villatoro is a 44 year old female patient here today for recheck ISK on chest .  Patient states this has been present for a while.  Patient reports the following symptoms: was irritated. Now resolved per pt .  Patient reports the following previous treatments: ln2.  Patient reports the following modifying factors: none.  Associated symptoms: none.  Patient has no other skin complaints today.  Remainder of the HPI, Meds, PMH, Allergies, FH, and SH was reviewed in chart.    Pertinent Hx:   Pts mother has had multiple BCC    Past Medical History:   Diagnosis Date     ASCUS favor benign 1/2015    neg HPV Plan cotest in 3 yrs.     Wart viral 2011       Past Surgical History:   Procedure Laterality Date     NO HISTORY OF SURGERY          Family History   Problem Relation Age of Onset     Neurologic Disorder Sister      Heart Disease Maternal Grandfather      Basal cell carcinoma Mother      Depression Paternal Aunt      Alcohol/Drug Paternal Uncle      Thyroid Disease Other         paternal cousin     Thyroid Disease Other        Social History     Socioeconomic History     Marital status:      Spouse name: Not on file     Number of children: 2     Years of education: Not on file     Highest education level: Not on file   Occupational History     Occupation:      Employer: [employer]     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   Social Needs     Financial resource strain: Not on file     Food insecurity:     Worry: Not on file     Inability: Not on file     Transportation needs:     Medical: Not on file     Non-medical: Not on file   Tobacco Use     Smoking status: Never Smoker     Smokeless tobacco: Never Used   Substance and Sexual Activity     Alcohol use: Yes     Alcohol/week: 0.5 oz     Comment: occasionally     Drug use: No     Sexual activity: Yes     Partners: Male     Birth control/protection: Condom, Pill   Lifestyle     Physical activity:     Days per week: Not on file     Minutes per  session: Not on file     Stress: Not on file   Relationships     Social connections:     Talks on phone: Not on file     Gets together: Not on file     Attends Christian service: Not on file     Active member of club or organization: Not on file     Attends meetings of clubs or organizations: Not on file     Relationship status: Not on file     Intimate partner violence:     Fear of current or ex partner: Not on file     Emotionally abused: Not on file     Physically abused: Not on file     Forced sexual activity: Not on file   Other Topics Concern     Parent/sibling w/ CABG, MI or angioplasty before 65F 55M? No   Social History Narrative    Caffeine intake/servings daily - 1    Calcium intake/servings daily - 1-2    Exercise 3 times weekly - describe walking, running    Sunscreen used - Yes    Seatbelts used - Yes    Guns stored in the home - No    Self Breast Exam - occ    Pap test up to date -  Last pap 5/13/2010    Eye exam up to date -  Yes    Dental exam up to date -  Yes    DEXA scan up to date -  Not Applicable    Flex Sig/Colonoscopy up to date -  Not Applicable    Mammography up to date -  Not Applicable    Immunizations reviewed and up to date - tdap 2008. Flu shot already    Abuse: Current or Past (Physical, Sexual or Emotional) - No    Do you feel safe in your environment - Yes    Do you cope well with stress - Yes    Do you suffer from insomnia - No    Last updated by: Shruti Eason MA.                               Outpatient Encounter Medications as of 4/11/2019   Medication Sig Dispense Refill     desogestrel-ethinyl estradiol (RECLIPSEN) 0.15-30 MG-MCG tablet Take 1 tablet by mouth daily 84 tablet 1     No facility-administered encounter medications on file as of 4/11/2019.        Review Of Systems:  Skin: As above  Eyes: negative  Ears/Nose/Throat: negative  Respiratory: No shortness of breath, dyspnea on exertion, cough, or hemoptysis  Cardiovascular: negative  Gastrointestinal:  negative  Genitourinary: negative  Musculoskeletal: negative  Neurologic: negative  Psychiatric: negative  Hematologic/Lymphatic/Immunologic: negative  Endocrine: negative      Objective:     BP 98/64   Pulse 61   SpO2 100%   Eyes: Conjunctivae/lids: Normal   ENT: Lips:  Normal  MSK: Normal  Cardiovascular: Peripheral edema none  Pulm: Breathing Normal  Neuro/Psych: Orientation: Normal; Mood/Affect: Normal, NAD, WDWN  Pt accompanied by: self  Following areas examined: face and chest  Adrian skin type:i   Findings:  Light brown/pink/hypopigmented smooth macule on chest  Assessment and Plan:  1) PIH  ISK resolved.  Disc PIH. Should resolve with time.  Signs and Symptoms of non-melanoma skin cancer and ABCDEs of melanoma reviewed with patient. Patient encouraged to perform monthly self skin exams and educated on how to perform them. UV precautions reviewed with patient. Patient was asked about new or changing moles/lesions on body.   Wear a sunscreen with at least SPF 30 on your face, ears, neck and V of the chest daily. Wear sunscreen on other areas of the body if those areas are exposed to the sun throughout the day. Sunscreens can contain physical and/or chemical blockers. Physical blockers are less likely to clog pores, these include zinc oxide and titanium dioxide. Reapply every two hour and after swimming. Sunscreen examples include Neutrogena, CeraVe, Blue Lizard, Elta MD and many others.      Follow up in yearly FBE

## 2019-04-16 ENCOUNTER — OFFICE VISIT (OUTPATIENT)
Dept: FAMILY MEDICINE | Facility: CLINIC | Age: 44
End: 2019-04-16
Payer: COMMERCIAL

## 2019-04-16 VITALS
DIASTOLIC BLOOD PRESSURE: 69 MMHG | HEIGHT: 62 IN | BODY MASS INDEX: 22.63 KG/M2 | RESPIRATION RATE: 16 BRPM | TEMPERATURE: 97.6 F | WEIGHT: 123 LBS | HEART RATE: 69 BPM | OXYGEN SATURATION: 100 % | SYSTOLIC BLOOD PRESSURE: 118 MMHG

## 2019-04-16 DIAGNOSIS — R25.3 EYELID TWITCH: Primary | ICD-10-CM

## 2019-04-16 LAB
ALBUMIN SERPL-MCNC: 3.7 G/DL (ref 3.4–5)
ALP SERPL-CCNC: 60 U/L (ref 40–150)
ALT SERPL W P-5'-P-CCNC: 20 U/L (ref 0–50)
ANION GAP SERPL CALCULATED.3IONS-SCNC: 9 MMOL/L (ref 3–14)
AST SERPL W P-5'-P-CCNC: 20 U/L (ref 0–45)
BILIRUB SERPL-MCNC: 0.3 MG/DL (ref 0.2–1.3)
BUN SERPL-MCNC: 10 MG/DL (ref 7–30)
CALCIUM SERPL-MCNC: 9.6 MG/DL (ref 8.5–10.1)
CHLORIDE SERPL-SCNC: 108 MMOL/L (ref 94–109)
CO2 SERPL-SCNC: 24 MMOL/L (ref 20–32)
CREAT SERPL-MCNC: 0.8 MG/DL (ref 0.52–1.04)
DEPRECATED CALCIDIOL+CALCIFEROL SERPL-MC: 32 UG/L (ref 20–75)
GFR SERPL CREATININE-BSD FRML MDRD: 89 ML/MIN/{1.73_M2}
GLUCOSE SERPL-MCNC: 88 MG/DL (ref 70–99)
HGB BLD-MCNC: 14.7 G/DL (ref 11.7–15.7)
POTASSIUM SERPL-SCNC: 4.2 MMOL/L (ref 3.4–5.3)
PROT SERPL-MCNC: 8.1 G/DL (ref 6.8–8.8)
SODIUM SERPL-SCNC: 141 MMOL/L (ref 133–144)
VIT B12 SERPL-MCNC: 508 PG/ML (ref 193–986)

## 2019-04-16 PROCEDURE — 99214 OFFICE O/P EST MOD 30 MIN: CPT | Performed by: NURSE PRACTITIONER

## 2019-04-16 PROCEDURE — 82306 VITAMIN D 25 HYDROXY: CPT | Performed by: NURSE PRACTITIONER

## 2019-04-16 PROCEDURE — 82607 VITAMIN B-12: CPT | Performed by: NURSE PRACTITIONER

## 2019-04-16 PROCEDURE — 85018 HEMOGLOBIN: CPT | Performed by: NURSE PRACTITIONER

## 2019-04-16 PROCEDURE — 36415 COLL VENOUS BLD VENIPUNCTURE: CPT | Performed by: NURSE PRACTITIONER

## 2019-04-16 PROCEDURE — 80053 COMPREHEN METABOLIC PANEL: CPT | Performed by: NURSE PRACTITIONER

## 2019-04-16 ASSESSMENT — MIFFLIN-ST. JEOR: SCORE: 1158

## 2019-04-16 NOTE — PATIENT INSTRUCTIONS
Patient Education     Eye Twitching    An eye twitch is an automatic blinking of your eyelid that you can t control. This abnormal blinking may happen many times per day. If eye twitching is severe, it can cause problems with your eyesight.  What causes eye twitching?  The eyelids are controlled by small muscles. One muscle (orbicularis oculi) closes your eyelid. Another muscle (levator palpebrae superioris) raises your eyelid. Problems with one or both of these muscles may cause eye twitching. In some cases, other muscles may also cause the problem.  Many people have an eye twitch once in a while. This can happen if you are tired or have had a lot of caffeine. Eye twitching that happens often is less common. It happens most often in women who are middle-aged or older adults.  Severe eye twitching is often due to a condition called benign essential blepharospasm. Researchers aren t sure what causes this. It may be caused by problems with a part of the brain (basal ganglia). Certain genes may cause eye twitching. In rare cases eye twitching may be caused by a problem with the brain or nervous system such as:    Parkinson s disease    Brain damage from inflammation or a stroke    Bad reaction to certain medicines used to treat mental illness  Symptoms of eye twitching  Eye twitching varies from person to person. In most cases, only the upper eyelid twitches. Your eyelid may only partly shut, or it may fully close. You may have twitching every few seconds, or just a few times a day. Twitching may last for a few days or more, and then go away for a while. Your eye twitching may happen more often over time, and not go away. Or the symptoms may go away and not come back.  You may have other symptoms such as:    Eye irritation or pain    Blinking faster    Eyes feel sensitive to light    Dry eyes    Trouble seeing normally, if twitching happens often    Spasms of facial muscles  Symptoms may go away when you sleep, or  focus on a difficult task. The symptoms may go away when you talk, sing, or touch another part of your body.  Other things may cause symptoms, such as:    Feeling very tired or weak    Stress    Bright lights    Driving    Caffeine    Eye irritation from another cause  Diagnosing eye twitching  You ll be asked about your medical history and your symptoms. You ll be given a physical exam. This often includes a full neurological and eye exam. A doctor who specializes in the eyes (ophthalmologist) may diagnose you. You may not need any other tests. Or your doctor may do imaging tests of your brain. These may include a CT scan or an MRI scan. These can find other causes of eye twitch.  Treatment for eye twitching  You may not need any treatment if you don t have severe symptoms. You may be told to get enough sleep and reduce the amount of caffeine in your diet. Or you may be given medicine to treat eye twitching. It may help reduce symptoms for a short period of time. You may also need treatment for any health condition that is causing your eye twitching, such as Parkinson s disease.  If your eye twitching is severe, you may have a Botox (botulinum toxin) shot in the muscles of your eyelids. This can paralyze the muscle that is causing the twitching. Or you may need a surgery called a myectomy. During this surgery, some of the muscles and nerves in your eyelids are removed.  Possible complications of eye twitching  If eye twitching is severe and does not stop, it can cause lasting (permanent) damage to your eye area. This can cause problems such as:    Upper eyelids that hang lower than normal    Eyebrows that hang lower than normal    Extra skin on the upper or lower eyelid    Eyelids that fold inward    Muscle spasms in other parts of the body, like the jaw or neck  How to manage eye twitching  If your eyes sometimes twitch, you can take steps to reduce your symptoms. Make sure to:    Not have caffeine    Get enough  sleep    Reduce your stress    Use eye drops if you have eye irritation    Wear sunglasses when needed     When to call your healthcare provider  Call your healthcare provider right away if you have any of the following:    Symptoms that get worse    New symptoms, such as facial spasms or fluid from your eye   Date Last Reviewed: 2/1/2017 2000-2018 [employer]. [address] [place] [address], [place] [address]. All rights reserved. This information is not intended as a substitute for professional medical care. Always follow your healthcare professional's instructions.

## 2019-04-18 NOTE — RESULT ENCOUNTER NOTE
Federico York,    This note is to let you know that all of your blood test results came back normal.  Your vitamin D level did come back on the low side of normal at 32.  The ranges 20-75.  With summer approaching, you will get more natural vitamin D from the sun and weather.  I do not necessarily recommend a supplement at this time, but I would recommend that you take the vitamin D supplement next winter.  I usually tell people to take vitamin D from Labor Day until Memorial Day, 2000 IUs/day.    Let me know if you have any questions.    Elham JACKSON CNP

## 2019-05-01 ENCOUNTER — MYC MEDICAL ADVICE (OUTPATIENT)
Dept: FAMILY MEDICINE | Facility: CLINIC | Age: 44
End: 2019-05-01

## 2019-05-01 DIAGNOSIS — R25.3 EYELID TWITCH: Primary | ICD-10-CM

## 2019-05-02 ENCOUNTER — OFFICE VISIT (OUTPATIENT)
Dept: OPTOMETRY | Facility: CLINIC | Age: 44
End: 2019-05-02
Payer: COMMERCIAL

## 2019-05-02 DIAGNOSIS — G51.4 EYELID MYOKYMIA: Primary | ICD-10-CM

## 2019-05-02 DIAGNOSIS — H52.4 PRESBYOPIA: ICD-10-CM

## 2019-05-02 ASSESSMENT — VISUAL ACUITY
OS_SC: 20/20
OS_SC+: -1
OD_SC: 20/20
METHOD: SNELLEN - LINEAR
CORRECTION_TYPE: GLASSES

## 2019-05-02 ASSESSMENT — REFRACTION_MANIFEST
OD_SPHERE: PLANO
OS_SPHERE: -0.25
OS_CYLINDER: SPHERE

## 2019-05-02 ASSESSMENT — CONF VISUAL FIELD
OS_NORMAL: 1
OD_NORMAL: 1

## 2019-05-02 ASSESSMENT — SLIT LAMP EXAM - LIDS
COMMENTS: NORMAL
COMMENTS: NORMAL

## 2019-05-02 ASSESSMENT — EXTERNAL EXAM - LEFT EYE: OS_EXAM: NORMAL

## 2019-05-02 ASSESSMENT — TONOMETRY
OD_IOP_MMHG: 16
IOP_METHOD: ICARE
OS_IOP_MMHG: 17

## 2019-05-02 ASSESSMENT — CUP TO DISC RATIO
OS_RATIO: 0.25
OD_RATIO: 0.25

## 2019-05-02 ASSESSMENT — EXTERNAL EXAM - RIGHT EYE: OD_EXAM: NORMAL

## 2019-05-02 NOTE — PROGRESS NOTES
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 A cool compress might also be helpful. If this condition worsens and begins to affect her entire eyelid, patient was encouraged to contact clinic for follow up.     (H52.4) Presbyopia  Comment: Essentially emmetropic at distance   Plan: REFRACTION [57063]        SRx updated and released. OTC +1.00 reading glasses should be adequate at this time. Patient may consider a progressive lens in the future if removing reading glasses becomes cumbersome.       Complete documentation of historical and exam elements from today's encounter can  be found in the full encounter summary report (not reduplicated in this progress  note). I personally obtained the chief complaint(s) and history of present illness. I  confirmed and edited as necessary the review of systems, past medical/surgical  history, family history, social history, and examination findings as documented by  others; and I examined the patient myself. I personally reviewed the relevant tests,  images, and reports as documented above. I formulated and edited as necessary the  assessment and plan and discussed the findings and management plan with the  patient and family.    Torey Katz OD

## 2019-05-15 ENCOUNTER — OFFICE VISIT (OUTPATIENT)
Dept: NEUROLOGY | Facility: CLINIC | Age: 44
End: 2019-05-15
Payer: COMMERCIAL

## 2019-05-15 VITALS
DIASTOLIC BLOOD PRESSURE: 72 MMHG | SYSTOLIC BLOOD PRESSURE: 118 MMHG | OXYGEN SATURATION: 99 % | HEART RATE: 71 BPM | WEIGHT: 124.5 LBS | BODY MASS INDEX: 22.92 KG/M2 | TEMPERATURE: 98.1 F | RESPIRATION RATE: 16 BRPM

## 2019-05-15 DIAGNOSIS — G51.4 EYELID MYOKYMIA: Primary | ICD-10-CM

## 2019-05-15 PROCEDURE — 99205 OFFICE O/P NEW HI 60 MIN: CPT | Performed by: PSYCHIATRY & NEUROLOGY

## 2019-05-15 NOTE — LETTER
"    5/15/2019         RE: Chela Villatoro  [address] [facility] [id number]        Dear Colleague,    Thank you for referring your patient, Chela Villatoro, to the [facility]. Please see a copy of my visit note below.    INITIAL NEUROLOGY CONSULTATION    DATE OF VISIT: 5/15/2019  CLINIC LOCATION: [facility]  MRN: [id number]  PATIENT NAME: Chela Villatoro  YOB: 1975    PRIMARY CARE PROVIDER: MANUEL Duran CNP     REASON FOR VISIT:   Chief Complaint   Patient presents with     Consult     left eye twitch and some under the rib cage     HISTORY OF PRESENT ILLNESS:                                                    Ms. Chela Villatoro is 44 year old right handed female patient without significant past medical history, who was seen in consultation today requested by MANUEL Maya CNP, for intermittent left upper eyelid twitching.    Per patient's report, she was in her usual state of health until approximately 3 months ago, when without precipitating causes she developed frequent intermittent left upper eyelid twitching without any additional complaints, including focal neurological symptoms.  It occurs briefly lasting seconds multiple times throughout the day almost every day.  In addition, for the last several weeks she also noticed intermittent very mild \"twitching in the diaphragm\" (epigastric area).  These symptoms make her extremely anxious about possibility of neurological condition.  No aggravating or alleviating factors.  No treatments tried.  Was evaluated by optometrist, who felt that eye twitching is consistent with eyelid myokymia.    The patient reports that her sleep recently is worsened, though she still gets 6 to 7 hours of sleep per night.  She drinks 1 to 2 cups of coffee without additional caffeinated beverages.  She rates her stress level as high.    Recent laboratory evaluation from April 2019 includes unremarkable CMP, " normal vitamin B12 (508), vitamin D (32), and WBC.  Her TSH was normal (1.7) in September 2018.    No prior brain imaging.  No additional useful information is available in Care Everywhere, which was reviewed.    Review of Systems - the patient endorses anxiety and headaches.  Both were previously discussed with other medical providers.  Otherwise, she denies any other complaints on 14-point comprehensive review of systems.  PAST MEDICAL/SURGICAL HISTORY:                                                    I personally reviewed patient's past medical and surgical history with the patient at today's visit.  Patient Active Problem List   Diagnosis     Uses oral contraception     CARDIOVASCULAR SCREENING; LDL GOAL LESS THAN 160     Plantar warts     Right low back pain, unspecified chronicity, with sciatica presence unspecified     Hip pain, right     Hip impingement syndrome, right     Past Medical History:   Diagnosis Date     ASCUS favor benign 1/2015    neg HPV Plan cotest in 3 yrs.     Wart viral 2011     Past Surgical History:   Procedure Laterality Date     NO HISTORY OF SURGERY       MEDICATIONS:                                                    I personally reviewed patient's medications and allergies with the patient at today's visit.  Current Outpatient Medications on File Prior to Visit:  desogestrel-ethinyl estradiol (RECLIPSEN) 0.15-30 MG-MCG tablet Take 1 tablet by mouth daily     ALLERGIES:                                                      Allergies   Allergen Reactions     Nkda [No Known Drug Allergies]      FAMILY/SOCIAL HISTORY:                                                    Family and social history was reviewed with the patient at today's visit.  Never smoker.  2 alcohol drinks 2 times week.  Denies use of recreational drugs.  , lives with family.  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  REVIEW OF SYSTEMS:                                                     Patient has completed a [facility] Patient Health History, including a 14-system review, which was personally reviewed, and pertinent positives are listed in HPI. She denies any additional problems on the further questioning.  EXAM:                                                    VITAL SIGNS:   /72 (BP Location: Left arm, Patient Position: Sitting, Cuff Size: Adult Regular)   Pulse 71   Temp 98.1  F (36.7  C) (Oral)   Resp 16   Wt 56.5 kg (124 lb 8 oz)   LMP 05/15/2019   SpO2 99%   Breastfeeding? No   BMI 22.92 kg/m     Mini-Cog Assessment:  Mini Cog Assessment  Clock Draw Score: 2 Normal  3 Item Recall: 3 objects recalled  Mini Cog Total Score: 5  Administered by: : Rosana Steele MA    General: pt is in NAD, cooperative.  Skin: normal turgor, moist mucous membranes, no lesions/rashes noticed.  HEENT: ATNC, EOMI, PERRL, white sclera, normal conjunctiva, no nystagmus or ptosis. No carotid bruits bilaterally.  Respiratory: lung sounds clear to auscultation bilaterally, no crackles, wheezes, rhonchi. Symmetric lung excursion, no accessory respiratory muscle use.  Cardiovascular: normal S1/S2, no murmurs/rubs/gallops.   Abdomen: Not distended.  : deferred.    Neurological:  Mental: alert, follows commands, Mini Cog Total Score: 5/5 with 3/3 on memory recall, no aphasia or dysarthria. Fund of knowledge is appropriate for age.  Cranial Nerves:  CN II: visual acuity - able to accurately count fingers with each eye. Visual fields intact, fundi: discs sharp, no papilledema and normal vessels bilaterally.  CN III, IV, VI: EOM intact, pupils equal and reactive.  No eyelid twitching is seen.  CN V: facial sensation nl  CN VII: face symmetric, no facial droop  CN VIII: hearing normal  CN IX: palate elevation symmetric, uvula at midline  CN XI SCM normal, shoulder shrug nl  CN XII: tongue midline  Motor: Strength: 5/5 in all major groups of all extremities. Normal tone. No abnormal movements. No  pronator drift b/l.  Reflexes: Triceps, biceps, brachioradialis, patellar, and achilles reflexes normal and symmetric. No clonus noted. Toes are down-going b/l.   Sensory: temperature, light touch, pinprick, and vibration intact. Romberg: negative.  Coordination: FNF and heel-shin tests intact b/l.   Gait:  Normal, able to tandem, toe, and heel walk.  DATA:   LABS/IMAGING/OTHER STUDIES: I reviewed pertinent medical records, including Care Everywhere, as detailed in the history of present illness.  ASSESSMENT AND PLAN:      ASSESSMENT: Chela Villatoro is a 44 year old female patient without significant past medical history, who presents with intermittent frequent left upper lid eye twitching present for 3 months.    We had a detailed discussion with the patient regarding her symptoms.  Her neurological exam today is non-focal.  I reviewed with the patient that this condition is usually benign related to sleep deprivation, physical exertion, elevated anxiety/stress level and excessive caffeine/alcohol use.  No additional neurological evaluation is required at this point.  However, if symptoms become continuous, or she develops additional cranial nerve involvement, brain MRI should be considered in the future.    DIAGNOSES:    ICD-10-CM    1. 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  No additional neurological evaluation is needed at this point.  I counseled the patient to come back if her symptoms worsen or she develops new neurological symptoms.    No medication changes.    Next follow-up appointment is on as needed basis.    Total Time:  61 minutes with > 50% spent counseling the patient on stated above assessment and recommendations, including nature of the diagnosis and proposed plan.  Additional time was used to answer questions regarding patient's symptoms, my recommendations, and  the plan.    Gio Taylor MD  / Neurology  [place]  (Chart documentation was completed in part with Dragon voice-recognition software. Even though reviewed, some grammatical, spelling, and word errors may remain.)            Again, thank you for allowing me to participate in the care of your patient.        Sincerely,        Gio Taylor MD

## 2019-05-15 NOTE — PATIENT INSTRUCTIONS
AFTER VISIT SUMMARY (AVS):    At today's visit we thoroughly discussed various diagnostic possibilities for your symptoms, possible future evaluation, and the plan.  No additional neurological evaluation is needed at this point.  However, if your symptoms worsen or you develop new neurological symptoms, please come back for additional testing.    No medication changes.    Next follow-up appointment is on as needed basis.    Please do not hesitate to call me with any questions or concerns.    Thanks.

## 2019-05-15 NOTE — PROGRESS NOTES
"INITIAL NEUROLOGY CONSULTATION    DATE OF VISIT: 5/15/2019  CLINIC LOCATION: [facility]  MRN: [id number]  PATIENT NAME: Chela Villatoro  YOB: 1975    PRIMARY CARE PROVIDER: MANUEL Duran CNP     REASON FOR VISIT:   Chief Complaint   Patient presents with     Consult     left eye twitch and some under the rib cage     HISTORY OF PRESENT ILLNESS:                                                    Ms. Chela Villatoro is 44 year old right handed female patient without significant past medical history, who was seen in consultation today requested by MANUEL Maya CNP, for intermittent left upper eyelid twitching.    Per patient's report, she was in her usual state of health until approximately 3 months ago, when without precipitating causes she developed frequent intermittent left upper eyelid twitching without any additional complaints, including focal neurological symptoms.  It occurs briefly lasting seconds multiple times throughout the day almost every day.  In addition, for the last several weeks she also noticed intermittent very mild \"twitching in the diaphragm\" (epigastric area).  These symptoms make her extremely anxious about possibility of neurological condition.  No aggravating or alleviating factors.  No treatments tried.  Was evaluated by optometrist, who felt that eye twitching is consistent with eyelid myokymia.    The patient reports that her sleep recently is worsened, though she still gets 6 to 7 hours of sleep per night.  She drinks 1 to 2 cups of coffee without additional caffeinated beverages.  She rates her stress level as high.    Recent laboratory evaluation from April 2019 includes unremarkable CMP, normal vitamin B12 (508), vitamin D (32), and WBC.  Her TSH was normal (1.7) in September 2018.    No prior brain imaging.  No additional useful information is available in Care Everywhere, which was reviewed.    Review of Systems - the patient " endorses anxiety and headaches.  Both were previously discussed with other medical providers.  Otherwise, she denies any other complaints on 14-point comprehensive review of systems.  PAST MEDICAL/SURGICAL HISTORY:                                                    I personally reviewed patient's past medical and surgical history with the patient at today's visit.  Patient Active Problem List   Diagnosis     Uses oral contraception     CARDIOVASCULAR SCREENING; LDL GOAL LESS THAN 160     Plantar warts     Right low back pain, unspecified chronicity, with sciatica presence unspecified     Hip pain, right     Hip impingement syndrome, right     Past Medical History:   Diagnosis Date     ASCUS favor benign 1/2015    neg HPV Plan cotest in 3 yrs.     Wart viral 2011     Past Surgical History:   Procedure Laterality Date     NO HISTORY OF SURGERY       MEDICATIONS:                                                    I personally reviewed patient's medications and allergies with the patient at today's visit.  Current Outpatient Medications on File Prior to Visit:  desogestrel-ethinyl estradiol (RECLIPSEN) 0.15-30 MG-MCG tablet Take 1 tablet by mouth daily     ALLERGIES:                                                      Allergies   Allergen Reactions     Nkda [No Known Drug Allergies]      FAMILY/SOCIAL HISTORY:                                                    Family and social history was reviewed with the patient at today's visit.  Never smoker.  2 alcohol drinks 2 times week.  Denies use of recreational drugs.  , lives with family.  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  REVIEW OF SYSTEMS:                                                    Patient has completed a [facility] Patient Health History, including a 14-system review, which was personally reviewed, and pertinent positives are listed in HPI. She denies any additional problems on the further questioning.  EXAM:                                                     VITAL SIGNS:   /72 (BP Location: Left arm, Patient Position: Sitting, Cuff Size: Adult Regular)   Pulse 71   Temp 98.1  F (36.7  C) (Oral)   Resp 16   Wt 56.5 kg (124 lb 8 oz)   LMP 05/15/2019   SpO2 99%   Breastfeeding? No   BMI 22.92 kg/m    Mini-Cog Assessment:  Mini Cog Assessment  Clock Draw Score: 2 Normal  3 Item Recall: 3 objects recalled  Mini Cog Total Score: 5  Administered by: : Rosana Steele MA    General: pt is in NAD, cooperative.  Skin: normal turgor, moist mucous membranes, no lesions/rashes noticed.  HEENT: ATNC, EOMI, PERRL, white sclera, normal conjunctiva, no nystagmus or ptosis. No carotid bruits bilaterally.  Respiratory: lung sounds clear to auscultation bilaterally, no crackles, wheezes, rhonchi. Symmetric lung excursion, no accessory respiratory muscle use.  Cardiovascular: normal S1/S2, no murmurs/rubs/gallops.   Abdomen: Not distended.  : deferred.    Neurological:  Mental: alert, follows commands, Mini Cog Total Score: 5/5 with 3/3 on memory recall, no aphasia or dysarthria. Fund of knowledge is appropriate for age.  Cranial Nerves:  CN II: visual acuity - able to accurately count fingers with each eye. Visual fields intact, fundi: discs sharp, no papilledema and normal vessels bilaterally.  CN III, IV, VI: EOM intact, pupils equal and reactive.  No eyelid twitching is seen.  CN V: facial sensation nl  CN VII: face symmetric, no facial droop  CN VIII: hearing normal  CN IX: palate elevation symmetric, uvula at midline  CN XI SCM normal, shoulder shrug nl  CN XII: tongue midline  Motor: Strength: 5/5 in all major groups of all extremities. Normal tone. No abnormal movements. No pronator drift b/l.  Reflexes: Triceps, biceps, brachioradialis, patellar, and achilles reflexes normal and symmetric. No clonus noted. Toes are down-going b/l.   Sensory: temperature, light touch, pinprick, and vibration intact. Romberg:  negative.  Coordination: FNF and heel-shin tests intact b/l.   Gait:  Normal, able to tandem, toe, and heel walk.  DATA:   LABS/IMAGING/OTHER STUDIES: I reviewed pertinent medical records, including Care Everywhere, as detailed in the history of present illness.  ASSESSMENT AND PLAN:      ASSESSMENT: Chela Villatoro is a 44 year old female patient without significant past medical history, who presents with intermittent frequent left upper lid eye twitching present for 3 months.    We had a detailed discussion with the patient regarding her symptoms.  Her neurological exam today is non-focal.  I reviewed with the patient that this condition is usually benign related to sleep deprivation, physical exertion, elevated anxiety/stress level and excessive caffeine/alcohol use.  No additional neurological evaluation is required at this point.  However, if symptoms become continuous, or she develops additional cranial nerve involvement, brain MRI should be considered in the future.    DIAGNOSES:    ICD-10-CM    1. 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  No additional neurological evaluation is needed at this point.  I counseled the patient to come back if her symptoms worsen or she develops new neurological symptoms.    No medication changes.    Next follow-up appointment is on as needed basis.    Total Time:  61 minutes with > 50% spent counseling the patient on stated above assessment and recommendations, including nature of the diagnosis and proposed plan.  Additional time was used to answer questions regarding patient's symptoms, my recommendations, and the plan.    Gio Taylor MD  / Neurology  [place]  (Chart documentation was completed in part with Dragon voice-recognition software. Even though reviewed, some grammatical, spelling, and word errors may remain.)

## 2019-05-16 ENCOUNTER — OFFICE VISIT (OUTPATIENT)
Dept: FAMILY MEDICINE | Facility: CLINIC | Age: 44
End: 2019-05-16
Payer: COMMERCIAL

## 2019-05-16 VITALS
OXYGEN SATURATION: 99 % | SYSTOLIC BLOOD PRESSURE: 107 MMHG | RESPIRATION RATE: 16 BRPM | WEIGHT: 123 LBS | TEMPERATURE: 97.8 F | DIASTOLIC BLOOD PRESSURE: 62 MMHG | BODY MASS INDEX: 22.63 KG/M2 | HEART RATE: 77 BPM | HEIGHT: 62 IN

## 2019-05-16 DIAGNOSIS — F41.8 SITUATIONAL ANXIETY: Primary | ICD-10-CM

## 2019-05-16 PROCEDURE — 99214 OFFICE O/P EST MOD 30 MIN: CPT | Performed by: NURSE PRACTITIONER

## 2019-05-16 ASSESSMENT — MIFFLIN-ST. JEOR: SCORE: 1158

## 2019-05-16 NOTE — PATIENT INSTRUCTIONS
Patient Education     Understanding Anxiety Disorders    Almost everyone gets nervous now and then. It s normal to have knots in your stomach before a test, or for your heart to race on a first date. But an anxiety disorder is much more than a case of nerves. In fact, its symptoms may be overwhelming. But treatment can relieve many of these symptoms. Talking to your healthcare provider is the first step.  What are anxiety disorders?  An anxiety disorder causes intense feelings of panic and fear. These feelings may arise for no apparent reason. And they tend to recur again and again. They may prevent you from coping with life and cause you great distress. As a result, you may avoid anything that triggers your fear. In extreme cases, you may never leave the house. Anxiety disorders may cause other symptoms, such as:    Obsessive thoughts you can t control    Constant nightmares or painful thoughts of the past    Nausea, sweating, and muscle tension    Trouble sleeping or concentrating  What causes anxiety disorders?  Anxiety disorders tend to run in families. For some people, childhood abuse or neglect may play a role. For others, stressful life events or trauma may trigger anxiety disorders. Anxiety can trigger low self-esteem and poor coping skills.  Common anxiety disorders    Panic disorder. This causes an intense fear of being in danger.    Phobias. These are extreme fears of certain objects, places, or events.    Obsessive-compulsive disorder. This causes you to have unwanted thoughts and urges. You also may perform certain actions over and over.    Posttraumatic stress disorder. This occurs in people who have survived a terrible ordeal. It can cause nightmares and flashbacks about the event.    Generalized anxiety disorder. This causes constant worry that can greatly disrupt your life.   Getting better  You may believe that nothing can help you. Or, you might fear what others may think. But most anxiety  symptoms can be eased. Having an anxiety disorder is nothing to be ashamed of. Most people do best with treatment that combines medicine and therapy. These aren t cures. But they can help you live a healthier life.  Date Last Reviewed: 2/1/2017 2000-2018 [employer]. [address] [place] [address], [place] [address]. All rights reserved. This information is not intended as a substitute for professional medical care. Always follow your healthcare professional's instructions.

## 2019-05-16 NOTE — PROGRESS NOTES
"  SUBJECTIVE:   Chela Villatoro is a 44 year old female who presents to clinic today for the following   health issues:      Chief Complaint   Patient presents with     Anxiety     follow up     Chela has had problems with eye twitching and scattered twitching on her body for the last couple of months.   At this time, this is not improving.  She reports that this is causing excessive worry and anxiety.  She did see neurology yesterday.  She was told with that appointment that everything is fine, there is no reason to think this is anything worse than isolated muscular twitching.  And that she was told to be treated for anxiety.  She does state that she has a history of been having a lot of stress in her life.  Things are getting worse at this time or seem to be escalating.  She is in clinic today to talk about the possibility of medication management for her anxiety.    Exercise:  Usually works for her mood and overall feeling better.  She is now working out more again (it was a low exercise winter) and she does think this is helping.      Super busy.  Work stress.  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  Blended family with her SO a year ago.  SO's ex-wife \"is driving us crazy.\"  She goes to psychotherapy regularly.    She denies any depression.      Reviewed  and updated as needed this visit by clinical staff  Tobacco  Allergies  Meds  Med Hx  Surg Hx  Fam Hx  Soc Hx        Reviewed and updated as needed this visit by Provider         Patient Active Problem List   Diagnosis     Uses oral contraception     CARDIOVASCULAR SCREENING; LDL GOAL LESS THAN 160     Plantar warts     Right low back pain, unspecified chronicity, with sciatica presence unspecified     Hip pain, right     Hip impingement syndrome, right     Past Surgical History:   Procedure Laterality Date     NO HISTORY OF SURGERY         Social History     Tobacco Use     Smoking status: " "Never Smoker     Smokeless tobacco: Never Used   Substance Use Topics     Alcohol use: Yes     Alcohol/week: 0.5 oz     Comment: 2 drinks 2 times per week     Family History   Problem Relation Age of Onset     Neurologic Disorder Sister      Heart Disease Maternal Grandfather      Basal cell carcinoma Mother      Depression Paternal Aunt      Alcohol/Drug Paternal Uncle      Thyroid Disease Other         paternal cousin     Thyroid Disease Other          Current Outpatient Medications   Medication Sig Dispense Refill     desogestrel-ethinyl estradiol (RECLIPSEN) 0.15-30 MG-MCG tablet Take 1 tablet by mouth daily 84 tablet 1     Allergies   Allergen Reactions     Nkda [No Known Drug Allergies]      Recent Labs   Lab Test 04/16/19  0910 09/27/18  0935 02/02/18  1032  12/20/12  0836   LDL  --   --  95  --  75   HDL  --   --  118  --  100   TRIG  --   --  117  --  143   ALT 20 21  --   --   --    CR 0.80 0.77  --   --   --    GFRESTIMATED 89 82  --   --   --    GFRESTBLACK >90 >90  --   --   --    POTASSIUM 4.2 4.2  --   --   --    TSH  --  1.70 1.12   < >  --     < > = values in this interval not displayed.      BP Readings from Last 3 Encounters:   05/16/19 107/62   05/15/19 118/72   04/16/19 118/69    Wt Readings from Last 3 Encounters:   05/16/19 55.8 kg (123 lb)   05/15/19 56.5 kg (124 lb 8 oz)   04/16/19 55.8 kg (123 lb)               Labs reviewed in EPIC    ROS:  Constitutional, HEENT, cardiovascular, pulmonary, GI, , musculoskeletal, neuro, skin, endocrine and psych systems are negative, except as otherwise noted.    OBJECTIVE:     /62 (BP Location: Right arm, Patient Position: Sitting, Cuff Size: Adult Regular)   Pulse 77   Temp 97.8  F (36.6  C) (Oral)   Resp 16   Ht 1.57 m (5' 1.8\")   Wt 55.8 kg (123 lb)   LMP 05/15/2019   SpO2 99%   BMI 22.64 kg/m    Body mass index is 22.64 kg/m .  GENERAL APPEARANCE: healthy, alert and no distress. Smiling.   SKIN: warm and dry  PSYCH: mentation appears " normal and affect normal/bright.  Good eye contact.      ASSESSMENT/PLAN:     (F41.8) Situational anxiety  (primary encounter diagnosis)  Comment: Uncontrolled  Plan: sertraline (ZOLOFT) 50 MG tablet        I had a discussion with the patient today about her twitching, her underlying anxiety, lifestyle management/exercise/diet/sleep/psychotherapy etc.  For today, I do think it is a good idea for her to start  An SSRI.  I talked to her about the pros and cons of the medication, Read the side effect profile that comes with the medication and let me know if you have any questions/problems.  I do think she will be feeling somewhat better within the next 1 to 2 weeks with this medication but she is not to expect full improvement or  Improvement for approximately 1 month.  I did give her a 6-month supply this medication anticipating that this will be a good fit for her and did tell her that if at any point she is not feeling like the medication is working, if she worsens in any way, or needs any other adjustments with this medication, I would asked that she come into the clinic for recheck.  She agrees and understands.  Questions were answered.  I did also review with her her previous normal lab test results, her exams with neurology, and I encouraged her to continue to be patient with the muscle twitching.  She agrees and understands and will follow-up if muscle twitching worsens or will let me know if she has any other questions.        MANUEL Valera [facility]

## 2019-05-28 DIAGNOSIS — Z30.41 USES ORAL CONTRACEPTION: ICD-10-CM

## 2019-05-28 RX ORDER — DESOGESTREL AND ETHINYL ESTRADIOL 0.15-0.03
1 KIT ORAL DAILY
Qty: 84 TABLET | Refills: 0 | Status: SHIPPED | OUTPATIENT
Start: 2019-05-28 | End: 2019-07-17

## 2019-05-28 NOTE — TELEPHONE ENCOUNTER
"Requested Prescriptions   Pending Prescriptions Disp Refills     desogestrel-ethinyl estradiol (RECLIPSEN) 0.15-30 MG-MCG tablet 84 tablet 1     Sig: Take 1 tablet by mouth daily       Contraceptives Protocol Passed - 5/28/2019  4:31 PM        Passed - Patient is not a current smoker if age is 35 or older        Passed - Recent (12 mo) or future (30 days) visit within the authorizing provider's specialty     Patient had office visit in the last 12 months or has a visit in the next 30 days with authorizing provider or within the authorizing provider's specialty.  See \"Patient Info\" tab in inbasket, or \"Choose Columns\" in Meds & Orders section of the refill encounter.              Passed - Medication is active on med list        Passed - No active pregnancy on record        Passed - No positive pregnancy test in past 12 months        Prescription approved per [facility] Refill Protocol.  Alba Musa RN on 5/28/2019 at 4:41 PM    "

## 2019-05-28 NOTE — TELEPHONE ENCOUNTER
"Requested Prescriptions   Pending Prescriptions Disp Refills     desogestrel-ethinyl estradiol (RECLIPSEN) 0.15-30 MG-MCG tablet 84 tablet 1     Sig: Take 1 tablet by mouth daily       Contraceptives Protocol Passed - 5/28/2019  4:31 PM        Passed - Patient is not a current smoker if age is 35 or older        Passed - Recent (12 mo) or future (30 days) visit within the authorizing provider's specialty     Patient had office visit in the last 12 months or has a visit in the next 30 days with authorizing provider or within the authorizing provider's specialty.  See \"Patient Info\" tab in inbasket, or \"Choose Columns\" in Meds & Orders section of the refill encounter.              Passed - Medication is active on med list        Passed - No active pregnancy on record        Passed - No positive pregnancy test in past 12 months        Last Written Prescription Date:  2/8/19  Last Fill Quantity: 84,  # refills: 1   Last office visit: 2/8/2019 with provider:  Trudy Esquivel  Future Office Visit:      "

## 2019-06-03 ENCOUNTER — OFFICE VISIT (OUTPATIENT)
Dept: FAMILY MEDICINE | Facility: CLINIC | Age: 44
End: 2019-06-03
Payer: COMMERCIAL

## 2019-06-03 VITALS — SYSTOLIC BLOOD PRESSURE: 99 MMHG | DIASTOLIC BLOOD PRESSURE: 60 MMHG

## 2019-06-03 DIAGNOSIS — Z80.8 FAMILY HISTORY OF NONMELANOMA SKIN CANCER: ICD-10-CM

## 2019-06-03 DIAGNOSIS — L30.9 ECZEMA, UNSPECIFIED TYPE: Primary | ICD-10-CM

## 2019-06-03 PROCEDURE — 99213 OFFICE O/P EST LOW 20 MIN: CPT | Performed by: FAMILY MEDICINE

## 2019-06-03 RX ORDER — TRIAMCINOLONE ACETONIDE 1 MG/G
CREAM TOPICAL 2 TIMES DAILY
Qty: 30 G | Refills: 1 | Status: SHIPPED | OUTPATIENT
Start: 2019-06-03 | End: 2020-07-02

## 2019-06-03 NOTE — LETTER
6/3/2019         RE: Chela Villatoro  [address] [id number]        Dear Colleague,    Thank you for referring your patient, [place] Villatoro, [place] [facility] Please see a copy of my visit note below.    [facility] - PRIMARY CARE SKIN    CC: Lesion(s)  SUBJECTIVE:   Chela Villatoro is a(n) 44 year old female who presents to clinic today because of a lesion on the chest.    Issue One: A small raised spot above the left breast. She does use a moisturizer.  Onset: a couple of months ago.  Enlarging: NO - it has become smaller.  Bleeding: NO.  Itchy or irritating: YES - mild itchiness.  Pain or tenderness: NO.  Changing color: NO.    Personal Medical History  Skin cancer: NO  Psoriasis Lupus   NO NO     Family Medical History  Skin cancer: YES - mother had multiple basal cell carcinomas and squamous cell carcinomas  Psoriasis Autoimmune   NO NO     Sun Exposure History  Previous history of significant sun exposure:  Blistering sunburns: YES - 2 times.  Tanning beds: YES - when younger.  Sunscreen usage: YES, SPF: 30.  UV-protective clothing usage: NO.  Wide-brimmed hat usage: NO.  UV-protective sunglasses usage: YES.  Mid-day sun avoidance: NO.    Occupation:  (indoor).    Refer to electronic medical record (EMR) for past medical history and medications.    INTEGUMENTARY/SKIN: POSITIVE for changing lesion  ROS: 14 point review of systems was negative except the symptoms listed above in the HPI.    This document serves as a record of the services and decisions personally performed and made by Natalee Donato MD and was created by Louie Rosenberg, a trained medical scribe, based on personal observations and provider statements to the medical scribe.  Sarahy 3, 2019 12:23 PM   Louie Rosenberg    OBJECTIVE:   GENERAL: healthy, alert and no distress.  SKIN: Adrian Skin Type - II.  Trunk examined. The dermatoscope was used to help evaluate pigmented lesions.  Skin  Pertinent Findings:  Left upper chest, 3 cm left of midline, at T1-T3: faintly erythematous lightly scaly eruption in a patchy distribution. The largest patch is 20 mm in size and the smallest patch is 10 mm.    ASSESSMENT:     Encounter Diagnoses   Name Primary?     Family history of nonmelanoma skin cancer      Eczema, unspecified type Yes         PLAN:   Patient Instructions   FUTURE APPOINTMENTS  Follow up as needed.    TOPICAL STEROID INSTRUCTIONS  Triamcinolone 0.1% cream.  Apply two times per day for 10-14 days. Then, use only when needed.  1. Wash hands before applying topical steroid.  2. Apply sparingly (just enough to rub in) onto affected areas of the left chest.  (Use the adult fingertip unit (FTU) as a guide.) Apply no more than 1 FTU per area.      This higher strength steroid should never be used on face nor groin.    After the initial treatment, topical steroid may be used as needed for flare-ups but only for short-term treatment. If you are using this for prolonged periods of time to control flare-ups, return to clinic for re-evaluation of treatment.    Keep in mind to also regularly use moisturizer, as this preventative measure can help maintain your skin's natural protective moisture barrier.    DRY SKIN MANAGEMENT INSTRUCTIONS  Routine use of moisturizer is important for healthy, resilient skin not just for soft skin.     Sealing in moisture    Twice daily use of a moisturizer such as over-the-counter (OTC) CeraVe moisturizer cream (in the jar). CeraVe products contain ceramides and filaggrin proteins that can help to maintain the body's moisture layer.    After cleansing or washing, always apply moisturizer immediately after drying off (pat dry only) for best effect.    Protection while hydrating    Do not overuse soap. Unless you have been sweating extensively, just apply soap to groin and armpits.    Recommended products for body include: OTC unscented Dove for sensitive skin or OTC Vanicream  "cleansing bar.    Recommended facial cleansers include: OTC CeraVe hydrating facial cleanser or OTC Cetaphil daily facial cleanser.    Avoid use of    Scented/perfumed products    Irritating clothing (wool, new jeans, new/unwashed clothing, scratchy synthetics)    Neosporin or triple antibiotic topical products    Products containing aloe, herbs, Vitamin E, or other \"natural ingredients\".    Dryer sheets or fabric softeners (while symptoms are present)    If a topical medication is prescribed, apply topical prescription first, followed by use of moisturizing product.        TT: 20 minutes.  CT: 15 minutes.    The information in this document, created by the medical scribe for me, accurately reflects the services I personally performed and the decisions made by me. I have reviewed and approved this document for accuracy prior to leaving the patient care area.  Sarahy 3, 2019 12:23 PM  Natalee Donato MD  [facility]    Again, thank you for allowing me to participate in the care of your patient.        Sincerely,        Natalee Donato MD    "

## 2019-06-03 NOTE — PROGRESS NOTES
[facility] - PRIMARY CARE SKIN    CC: Lesion(s)  SUBJECTIVE:   Chela Villatoro is a(n) 44 year old female who presents to clinic today because of a lesion on the chest.    Issue One: A small raised spot above the left breast. She does use a moisturizer.  Onset: a couple of months ago.  Enlarging: NO - it has become smaller.  Bleeding: NO.  Itchy or irritating: YES - mild itchiness.  Pain or tenderness: NO.  Changing color: NO.    Personal Medical History  Skin cancer: NO  Psoriasis Lupus   NO NO     Family Medical History  Skin cancer: YES - mother had multiple basal cell carcinomas and squamous cell carcinomas  Psoriasis Autoimmune   NO NO     Sun Exposure History  Previous history of significant sun exposure:  Blistering sunburns: YES - 2 times.  Tanning beds: YES - when younger.  Sunscreen usage: YES, SPF: 30.  UV-protective clothing usage: NO.  Wide-brimmed hat usage: NO.  UV-protective sunglasses usage: YES.  Mid-day sun avoidance: NO.    Occupation:  (indoor).    Refer to electronic medical record (EMR) for past medical history and medications.    INTEGUMENTARY/SKIN: POSITIVE for changing lesion  ROS: 14 point review of systems was negative except the symptoms listed above in the HPI.    This document serves as a record of the services and decisions personally performed and made by Natalee Donato MD and was created by Louie Rosenberg, a trained medical scribe, based on personal observations and provider statements to the medical scribe.  Sarahy 3, 2019 12:23 PM   Louie Rosenberg    OBJECTIVE:   GENERAL: healthy, alert and no distress.  SKIN: Adrian Skin Type - II.  Trunk examined. The dermatoscope was used to help evaluate pigmented lesions.  Skin Pertinent Findings:  Left upper chest, 3 cm left of midline, at T1-T3: faintly erythematous lightly scaly eruption in a patchy distribution. The largest patch is 20 mm in size and the smallest patch is 10 mm.    ASSESSMENT:     Encounter Diagnoses    Name Primary?     Family history of nonmelanoma skin cancer      Eczema, unspecified type Yes         PLAN:   Patient Instructions   FUTURE APPOINTMENTS  Follow up as needed.    TOPICAL STEROID INSTRUCTIONS  Triamcinolone 0.1% cream.  Apply two times per day for 10-14 days. Then, use only when needed.  1. Wash hands before applying topical steroid.  2. Apply sparingly (just enough to rub in) onto affected areas of the left chest.  (Use the adult fingertip unit (FTU) as a guide.) Apply no more than 1 FTU per area.      This higher strength steroid should never be used on face nor groin.    After the initial treatment, topical steroid may be used as needed for flare-ups but only for short-term treatment. If you are using this for prolonged periods of time to control flare-ups, return to clinic for re-evaluation of treatment.    Keep in mind to also regularly use moisturizer, as this preventative measure can help maintain your skin's natural protective moisture barrier.    DRY SKIN MANAGEMENT INSTRUCTIONS  Routine use of moisturizer is important for healthy, resilient skin not just for soft skin.     Sealing in moisture    Twice daily use of a moisturizer such as over-the-counter (OTC) CeraVe moisturizer cream (in the jar). CeraVe products contain ceramides and filaggrin proteins that can help to maintain the body's moisture layer.    After cleansing or washing, always apply moisturizer immediately after drying off (pat dry only) for best effect.    Protection while hydrating    Do not overuse soap. Unless you have been sweating extensively, just apply soap to groin and armpits.    Recommended products for body include: OTC unscented Dove for sensitive skin or OTC Vanicream cleansing bar.    Recommended facial cleansers include: OTC CeraVe hydrating facial cleanser or OTC Cetaphil daily facial cleanser.    Avoid use of    Scented/perfumed products    Irritating clothing (wool, new jeans, new/unwashed clothing,  "scratchy synthetics)    Neosporin or triple antibiotic topical products    Products containing aloe, herbs, Vitamin E, or other \"natural ingredients\".    Dryer sheets or fabric softeners (while symptoms are present)    If a topical medication is prescribed, apply topical prescription first, followed by use of moisturizing product.        TT: 20 minutes.  CT: 15 minutes.    The information in this document, created by the medical scribe for me, accurately reflects the services I personally performed and the decisions made by me. I have reviewed and approved this document for accuracy prior to leaving the patient care area.  Sarahy 3, 2019 12:23 PM  Natalee Donato MD  [facility]  "

## 2019-06-03 NOTE — PATIENT INSTRUCTIONS
"FUTURE APPOINTMENTS  Follow up as needed.    TOPICAL STEROID INSTRUCTIONS  Triamcinolone 0.1% cream.  Apply two times per day for 10-14 days. Then, use only when needed.  1. Wash hands before applying topical steroid.  2. Apply sparingly (just enough to rub in) onto affected areas of the left chest.  (Use the adult fingertip unit (FTU) as a guide.) Apply no more than 1 FTU per area.      This higher strength steroid should never be used on face nor groin.    After the initial treatment, topical steroid may be used as needed for flare-ups but only for short-term treatment. If you are using this for prolonged periods of time to control flare-ups, return to clinic for re-evaluation of treatment.    Keep in mind to also regularly use moisturizer, as this preventative measure can help maintain your skin's natural protective moisture barrier.    DRY SKIN MANAGEMENT INSTRUCTIONS  Routine use of moisturizer is important for healthy, resilient skin not just for soft skin.     Sealing in moisture    Twice daily use of a moisturizer such as over-the-counter (OTC) CeraVe moisturizer cream (in the jar). CeraVe products contain ceramides and filaggrin proteins that can help to maintain the body's moisture layer.    After cleansing or washing, always apply moisturizer immediately after drying off (pat dry only) for best effect.    Protection while hydrating    Do not overuse soap. Unless you have been sweating extensively, just apply soap to groin and armpits.    Recommended products for body include: OTC unscented Dove for sensitive skin or OTC Vanicream cleansing bar.    Recommended facial cleansers include: OTC CeraVe hydrating facial cleanser or OTC Cetaphil daily facial cleanser.    Avoid use of    Scented/perfumed products    Irritating clothing (wool, new jeans, new/unwashed clothing, scratchy synthetics)    Neosporin or triple antibiotic topical products    Products containing aloe, herbs, Vitamin E, or other \"natural " "ingredients\".    Dryer sheets or fabric softeners (while symptoms are present)    If a topical medication is prescribed, apply topical prescription first, followed by use of moisturizing product.    "

## 2019-06-25 ENCOUNTER — OFFICE VISIT (OUTPATIENT)
Dept: DERMATOLOGY | Facility: CLINIC | Age: 44
End: 2019-06-25
Payer: COMMERCIAL

## 2019-06-25 VITALS — SYSTOLIC BLOOD PRESSURE: 115 MMHG | DIASTOLIC BLOOD PRESSURE: 74 MMHG | HEART RATE: 64 BPM

## 2019-06-25 DIAGNOSIS — D48.5 NEOPLASM OF UNCERTAIN BEHAVIOR OF SKIN: Primary | ICD-10-CM

## 2019-06-25 PROCEDURE — 99213 OFFICE O/P EST LOW 20 MIN: CPT | Mod: 25 | Performed by: PHYSICIAN ASSISTANT

## 2019-06-25 PROCEDURE — 11720 DEBRIDE NAIL 1-5: CPT | Performed by: PHYSICIAN ASSISTANT

## 2019-06-25 NOTE — LETTER
6/25/2019         RE: Chela Villatoro  [address] [facility] [address]        Dear Colleague,    Thank you for referring your patient, Chela Villatoro, to the [facility]. Please see a copy of my visit note below.    HPI:  Chela Villatoro is a 44 year old female patient here today for color change on right great toenail .  Patient states this has been present for 2-3 months.  Patient reports the following symptoms: none and not changing . Denies trauma to area. Just noticed it one day  Patient reports the following previous treatments: none.  Patient reports the following modifying factors: none.  Associated symptoms: none.  Patient has no other skin complaints today.  Remainder of the HPI, Meds, PMH, Allergies, FH, and SH was reviewed in chart.    Pertinent Hx:   No personal or family history of MM. Mother had multiple BCCs.  Past Medical History:   Diagnosis Date     ASCUS favor benign 1/2015    neg HPV Plan cotest in 3 yrs.     Wart viral 2011       Past Surgical History:   Procedure Laterality Date     NO HISTORY OF SURGERY          Family History   Problem Relation Age of Onset     Neurologic Disorder Sister      Heart Disease Maternal Grandfather      Basal cell carcinoma Mother      Depression Paternal Aunt      Alcohol/Drug Paternal Uncle      Thyroid Disease Other         paternal cousin     Thyroid Disease Other        Social History     Socioeconomic History     Marital status:      Spouse name: Not on file     Number of children: 2     Years of education: Not on file     Highest education level: Not on file   Occupational History     Occupation:      Employer: [employer]     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   Social Needs     Financial resource strain: Not on file     Food insecurity:     Worry: Not on file     Inability: Not on file     Transportation needs:     Medical: Not on file     Non-medical: Not on file   Tobacco Use     Smoking status: Never  Smoker     Smokeless tobacco: Never Used   Substance and Sexual Activity     Alcohol use: Yes     Alcohol/week: 0.5 oz     Comment: 2 drinks 2 times per week     Drug use: No     Sexual activity: Yes     Partners: Male     Birth control/protection: Condom, Pill   Lifestyle     Physical activity:     Days per week: Not on file     Minutes per session: Not on file     Stress: Not on file   Relationships     Social connections:     Talks on phone: Not on file     Gets together: Not on file     Attends Holiness service: Not on file     Active member of club or organization: Not on file     Attends meetings of clubs or organizations: Not on file     Relationship status: Not on file     Intimate partner violence:     Fear of current or ex partner: Not on file     Emotionally abused: Not on file     Physically abused: Not on file     Forced sexual activity: Not on file   Other Topics Concern     Parent/sibling w/ CABG, MI or angioplasty before 65F 55M? No   Social History Narrative    Caffeine intake/servings daily - 1    Calcium intake/servings daily - 1-2    Exercise 3 times weekly - describe walking, running    Sunscreen used - Yes    Seatbelts used - Yes    Guns stored in the home - No    Self Breast Exam - occ    Pap test up to date -  Last pap 5/13/2010    Eye exam up to date -  Yes    Dental exam up to date -  Yes    DEXA scan up to date -  Not Applicable    Flex Sig/Colonoscopy up to date -  Not Applicable    Mammography up to date -  Not Applicable    Immunizations reviewed and up to date - tdap 2008. Flu shot already    Abuse: Current or Past (Physical, Sexual or Emotional) - No    Do you feel safe in your environment - Yes    Do you cope well with stress - Yes    Do you suffer from insomnia - No    Last updated by: Shruti Eason MA.                               Outpatient Encounter Medications as of 6/25/2019   Medication Sig Dispense Refill     desogestrel-ethinyl estradiol (RECLIPSEN) 0.15-30 MG-MCG tablet  Take 1 tablet by mouth daily 84 tablet 0     sertraline (ZOLOFT) 50 MG tablet Take 1/2 tablet per day for one week and then 1 tablet per day. 90 tablet 1     triamcinolone (KENALOG) 0.1 % external cream Apply topically 2 times daily to affected areas on the chest for 10-14 days. Not for use on face nor groin. 30 g 1     No facility-administered encounter medications on file as of 6/25/2019.        Review Of Systems:  Skin: As above  Eyes: negative  Ears/Nose/Throat: negative  Respiratory: No shortness of breath, dyspnea on exertion, cough, or hemoptysis  Cardiovascular: negative  Gastrointestinal: negative  Genitourinary: negative  Musculoskeletal: negative  Neurologic: negative  Psychiatric: negative  Hematologic/Lymphatic/Immunologic: negative  Endocrine: negative      Objective:     /74   Pulse 64   Eyes: Conjunctivae/lids: Normal   ENT: Lips:  Normal  MSK: Normal  Cardiovascular: Peripheral edema none  Pulm: Breathing Normal  Neuro/Psych: Orientation: Normal; Mood/Affect: Normal, NAD, WDWN  Pt accompanied by: self  Following areas examined: face, right foot  Adrian skin type:i   Findings:  Dark brown smooth macules 3mm on right medial great toe nail. No nail dystrophy seen.  Assessment and Plan:  1) Neoplasm of uncertain behavior of right great toe nail  Disc biopsy to rule out MM  Disc possible etiologies including nevus, hematoma, and MM  Appt with Dr. Vance on 6/26/19 for nail biopsy    Signs and Symptoms of non-melanoma skin cancer and ABCDEs of melanoma.   Patient was asked about new or changing moles/lesions on body.   Pared down nail plate superior to pigment. After consent and prep, 15 blade used to pare down lesion. No complications and routine wound care.            Follow up in for nail biopsy and yearly FBE      Again, thank you for allowing me to participate in the care of your patient.        Sincerely,        Estee Huang PA-C

## 2019-06-25 NOTE — PATIENT INSTRUCTIONS
Proper skin care from [facility]:    -Eliminate harsh soaps as they strip the natural oils from the skin, often resulting in dry itchy skin ( i.e. Dial, Zest, Elis Spring)  -Use mild soaps such as Cetaphil or Dove Sensitive Skin in the shower. You do not need to use soap on arms, legs, and trunk every time you shower unless visibly soiled.   -Avoid hot or cold showers.  -After showering, lightly dry off and apply moisturizing within 2-3 minutes. This will help trap moisture in the skin.   -Aggressive use of a moisturizer at least 1-2 times a day to the entire body (including -Vanicream, Cetaphil, Aquaphor or Cerave) and moisturize hands after every washing.  -We recommend using moisturizers that come in a tub that needs to be scooped out, not a pump. This has more of an oil base. It will hold moisture in your skin much better than a water base moisturizer. The above recommended are non-pore clogging.           Wear a sunscreen with at least SPF 30 on your face, ears, neck and V of the chest daily. Wear sunscreen on other areas of the body if those areas are exposed to the sun throughout the day. Sunscreens can contain physical and/or chemical blockers. Physical blockers are less likely to clog pores, these include zinc oxide and titanium dioxide. Reapply every two hour and after swimming. Sunscreen examples include Neutrogena, CeraVe, Blue Lizard, Elta MD and many others.    UV radiation  UVA radiation remains constant throughout the day and throughout the year. It is a longer wavelength than UVB and therefore penetrates deeper into the skin leading to immediate and delayed tanning, photoaging, and skin cancer. 70-80% of UVA and UVB radiation occurs between the hours of 10am-2pm.  UVB radiation  UVB radiation causes the most harmful effects and is more significant during the summer months. However, snow and ice can reflect UVB radiation leading to skin damage during the winter months as well. UVB  radiation is responsible for tanning, burning, inflammation, delayed erythema (pinkness), pigmentation (brown spots), and skin cancer.

## 2019-06-26 ENCOUNTER — OFFICE VISIT (OUTPATIENT)
Dept: DERMATOLOGY | Facility: CLINIC | Age: 44
End: 2019-06-26
Payer: COMMERCIAL

## 2019-06-26 VITALS — DIASTOLIC BLOOD PRESSURE: 77 MMHG | SYSTOLIC BLOOD PRESSURE: 121 MMHG | HEART RATE: 70 BPM | OXYGEN SATURATION: 100 %

## 2019-06-26 DIAGNOSIS — L60.8 NAIL HEMORRHAGE: Primary | ICD-10-CM

## 2019-06-26 PROCEDURE — 99212 OFFICE O/P EST SF 10 MIN: CPT | Performed by: DERMATOLOGY

## 2019-06-26 NOTE — PROGRESS NOTES
Chela Villatoro is a 44 year old year old female patient here today for spot on right great toenail.   .  Patient states this has been present for a month.  Patient reports the following symptoms:  none.  Patient reports the following previous treatments none.  Patient reports the following modifying factors none.  Associated symptoms: none.  Patient has no other skin complaints today.  Remainder of the HPI, Meds, PMH, Allergies, FH, and SH was reviewed in chart.      Past Medical History:   Diagnosis Date     ASCUS favor benign 1/2015    neg HPV Plan cotest in 3 yrs.     Wart viral 2011       Past Surgical History:   Procedure Laterality Date     NO HISTORY OF SURGERY          Family History   Problem Relation Age of Onset     Neurologic Disorder Sister      Heart Disease Maternal Grandfather      Basal cell carcinoma Mother      Cancer Mother         BCC and SCC     Depression Paternal Aunt      Alcohol/Drug Paternal Uncle      Thyroid Disease Other         paternal cousin     Thyroid Disease Other        Social History     Socioeconomic History     Marital status:      Spouse name: Not on file     Number of children: 2     Years of education: Not on file     Highest education level: Not on file   Occupational History     Occupation:      Employer: [employer]     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   Social Needs     Financial resource strain: Not on file     Food insecurity:     Worry: Not on file     Inability: Not on file     Transportation needs:     Medical: Not on file     Non-medical: Not on file   Tobacco Use     Smoking status: Never Smoker     Smokeless tobacco: Never Used   Substance and Sexual Activity     Alcohol use: Yes     Alcohol/week: 0.5 oz     Comment: 2 drinks 2 times per week     Drug use: No     Sexual activity: Yes     Partners: Male     Birth control/protection: Condom, Pill   Lifestyle     Physical activity:     Days per week: Not on file     Minutes per session: Not on file      Stress: Not on file   Relationships     Social connections:     Talks on phone: Not on file     Gets together: Not on file     Attends Mormonism service: Not on file     Active member of club or organization: Not on file     Attends meetings of clubs or organizations: Not on file     Relationship status: Not on file     Intimate partner violence:     Fear of current or ex partner: Not on file     Emotionally abused: Not on file     Physically abused: Not on file     Forced sexual activity: Not on file   Other Topics Concern     Parent/sibling w/ CABG, MI or angioplasty before 65F 55M? No   Social History Narrative    Caffeine intake/servings daily - 1    Calcium intake/servings daily - 1-2    Exercise 3 times weekly - describe walking, running    Sunscreen used - Yes    Seatbelts used - Yes    Guns stored in the home - No    Self Breast Exam - occ    Pap test up to date -  Last pap 5/13/2010    Eye exam up to date -  Yes    Dental exam up to date -  Yes    DEXA scan up to date -  Not Applicable    Flex Sig/Colonoscopy up to date -  Not Applicable    Mammography up to date -  Not Applicable    Immunizations reviewed and up to date - tdap 2008. Flu shot already    Abuse: Current or Past (Physical, Sexual or Emotional) - No    Do you feel safe in your environment - Yes    Do you cope well with stress - Yes    Do you suffer from insomnia - No    Last updated by: Shruti Eason MA.                               Outpatient Encounter Medications as of 6/26/2019   Medication Sig Dispense Refill     desogestrel-ethinyl estradiol (RECLIPSEN) 0.15-30 MG-MCG tablet Take 1 tablet by mouth daily 84 tablet 0     sertraline (ZOLOFT) 50 MG tablet Take 1/2 tablet per day for one week and then 1 tablet per day. 90 tablet 1     triamcinolone (KENALOG) 0.1 % external cream Apply topically 2 times daily to affected areas on the chest for 10-14 days. Not for use on face nor groin. (Patient not taking: Reported on 6/26/2019) 30 g 1      No facility-administered encounter medications on file as of 6/26/2019.              Review Of Systems  Skin: As above  Eyes: negative  Ears/Nose/Throat: negative  Respiratory: No shortness of breath, dyspnea on exertion, cough, or hemoptysis  Cardiovascular: negative  Gastrointestinal: negative  Genitourinary: negative  Musculoskeletal: negative  Neurologic: negative  Psychiatric: negative  Hematologic/Lymphatic/Immunologic: negative  Endocrine: negative      O:   NAD, WDWN, Alert & Oriented, Mood & Affect wnl, Vitals stable   Here today alone   /77 (BP Location: Left arm, Patient Position: Sitting, Cuff Size: Adult Regular)   Pulse 70   SpO2 100%    General appearance normal   Vitals stable   Alert, oriented and in no acute distress     R great toenail red brown patch Completely removed with 15 blade      Eyes: Conjunctivae/lids:Normal     ENT: Lips, buccal mucosa, tongue: normal    MSK:Normal    Cardiovascular: peripheral edema none    Pulm: Breathing Normal    Neuro/Psych: Orientation:Normal; Mood/Affect:Normal      A/P:  1. Nail hemorrhage   Completely removed with 15 blade, no pigment or lesions on lateral nail bed or prximal nailbed        BENIGN LESIONS DISCUSSED WITH PATIENT:  I discussed the specifics of tumor, prognosis, and genetics of benign lesions.  I explained that treatment of these lesions would be purely cosmetic and not medically neccessary.  I discussed with patient different removal options including excision, cautery and /or laser.      Nature and genetics of benign skin lesions dicussed with patient.  Signs and Symptoms of skin cancer discussed with patient.  Patient encouraged to perform monthly skin exams.  UV precautions reviewed with patient.  Skin care regimen reviewed with patient: Eliminate harsh soaps, i.e. Dial, zest, irsih spring; Mild soaps such as Cetaphil or Dove sensitive skin, avoid hot or cold showers, aggressive use of emollients including vanicream, cetaphil or  cerave discussed with patient.    Return to clinic prn

## 2019-06-26 NOTE — LETTER
6/26/2019         RE: [facility]atoro  [address] [facility] [address]        Dear Colleague,    Thank you for referring your patient, Chela Villatoro, to the [facility]. Please see a copy of my visit note below.    Chela Villatoro is a 44 year old year old female patient here today for spot on right great toenail.   .  Patient states this has been present for a month.  Patient reports the following symptoms:  none.  Patient reports the following previous treatments none.  Patient reports the following modifying factors none.  Associated symptoms: none.  Patient has no other skin complaints today.  Remainder of the HPI, Meds, PMH, Allergies, FH, and SH was reviewed in chart.      Past Medical History:   Diagnosis Date     ASCUS favor benign 1/2015    neg HPV Plan cotest in 3 yrs.     Wart viral 2011       Past Surgical History:   Procedure Laterality Date     NO HISTORY OF SURGERY          Family History   Problem Relation Age of Onset     Neurologic Disorder Sister      Heart Disease Maternal Grandfather      Basal cell carcinoma Mother      Cancer Mother         BCC and SCC     Depression Paternal Aunt      Alcohol/Drug Paternal Uncle      Thyroid Disease Other         paternal cousin     Thyroid Disease Other        Social History     Socioeconomic History     Marital status:      Spouse name: Not on file     Number of children: 2     Years of education: Not on file     Highest education level: Not on file   Occupational History     Occupation:      Employer: [employer]     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   Social Needs     Financial resource strain: Not on file     Food insecurity:     Worry: Not on file     Inability: Not on file     Transportation needs:     Medical: Not on file     Non-medical: Not on file   Tobacco Use     Smoking status: Never Smoker     Smokeless tobacco: Never Used   Substance and Sexual Activity     Alcohol use: Yes     Alcohol/week: 0.5 oz      Comment: 2 drinks 2 times per week     Drug use: No     Sexual activity: Yes     Partners: Male     Birth control/protection: Condom, Pill   Lifestyle     Physical activity:     Days per week: Not on file     Minutes per session: Not on file     Stress: Not on file   Relationships     Social connections:     Talks on phone: Not on file     Gets together: Not on file     Attends Orthodox service: Not on file     Active member of club or organization: Not on file     Attends meetings of clubs or organizations: Not on file     Relationship status: Not on file     Intimate partner violence:     Fear of current or ex partner: Not on file     Emotionally abused: Not on file     Physically abused: Not on file     Forced sexual activity: Not on file   Other Topics Concern     Parent/sibling w/ CABG, MI or angioplasty before 65F 55M? No   Social History Narrative    Caffeine intake/servings daily - 1    Calcium intake/servings daily - 1-2    Exercise 3 times weekly - describe walking, running    Sunscreen used - Yes    Seatbelts used - Yes    Guns stored in the home - No    Self Breast Exam - occ    Pap test up to date -  Last pap 5/13/2010    Eye exam up to date -  Yes    Dental exam up to date -  Yes    DEXA scan up to date -  Not Applicable    Flex Sig/Colonoscopy up to date -  Not Applicable    Mammography up to date -  Not Applicable    Immunizations reviewed and up to date - tdap 2008. Flu shot already    Abuse: Current or Past (Physical, Sexual or Emotional) - No    Do you feel safe in your environment - Yes    Do you cope well with stress - Yes    Do you suffer from insomnia - No    Last updated by: Shruti Eason MA.                               Outpatient Encounter Medications as of 6/26/2019   Medication Sig Dispense Refill     desogestrel-ethinyl estradiol (RECLIPSEN) 0.15-30 MG-MCG tablet Take 1 tablet by mouth daily 84 tablet 0     sertraline (ZOLOFT) 50 MG tablet Take 1/2 tablet per day for one week and  then 1 tablet per day. 90 tablet 1     triamcinolone (KENALOG) 0.1 % external cream Apply topically 2 times daily to affected areas on the chest for 10-14 days. Not for use on face nor groin. (Patient not taking: Reported on 6/26/2019) 30 g 1     No facility-administered encounter medications on file as of 6/26/2019.              Review Of Systems  Skin: As above  Eyes: negative  Ears/Nose/Throat: negative  Respiratory: No shortness of breath, dyspnea on exertion, cough, or hemoptysis  Cardiovascular: negative  Gastrointestinal: negative  Genitourinary: negative  Musculoskeletal: negative  Neurologic: negative  Psychiatric: negative  Hematologic/Lymphatic/Immunologic: negative  Endocrine: negative      O:   NAD, WDWN, Alert & Oriented, Mood & Affect wnl, Vitals stable   Here today alone   /77 (BP Location: Left arm, Patient Position: Sitting, Cuff Size: Adult Regular)   Pulse 70   SpO2 100%    General appearance normal   Vitals stable   Alert, oriented and in no acute distress     R great toenail red brown patch Completely removed with 15 blade      Eyes: Conjunctivae/lids:Normal     ENT: Lips, buccal mucosa, tongue: normal    MSK:Normal    Cardiovascular: peripheral edema none    Pulm: Breathing Normal    Neuro/Psych: Orientation:Normal; Mood/Affect:Normal      A/P:  1. Nail hemorrhage   Completely removed with 15 blade, no pigment or lesions on lateral nail bed or prximal nailbed        BENIGN LESIONS DISCUSSED WITH PATIENT:  I discussed the specifics of tumor, prognosis, and genetics of benign lesions.  I explained that treatment of these lesions would be purely cosmetic and not medically neccessary.  I discussed with patient different removal options including excision, cautery and /or laser.      Nature and genetics of benign skin lesions dicussed with patient.  Signs and Symptoms of skin cancer discussed with patient.  Patient encouraged to perform monthly skin exams.  UV precautions reviewed with  patient.  Skin care regimen reviewed with patient: Eliminate harsh soaps, i.e. Dial, zest, irsih spring; Mild soaps such as Cetaphil or Dove sensitive skin, avoid hot or cold showers, aggressive use of emollients including vanicream, cetaphil or cerave discussed with patient.    Return to clinic prn     Again, thank you for allowing me to participate in the care of your patient.        Sincerely,        Benjamin Vance MD

## 2019-06-26 NOTE — PROGRESS NOTES
HPI:  Chela Villatoro is a 44 year old female patient here today for color change on right great toenail .  Patient states this has been present for 2-3 months.  Patient reports the following symptoms: none and not changing . Denies trauma to area. Just noticed it one day  Patient reports the following previous treatments: none.  Patient reports the following modifying factors: none.  Associated symptoms: none.  Patient has no other skin complaints today.  Remainder of the HPI, Meds, PMH, Allergies, FH, and SH was reviewed in chart.    Pertinent Hx:   No personal or family history of MM. Mother had multiple BCCs.  Past Medical History:   Diagnosis Date     ASCUS favor benign 1/2015    neg HPV Plan cotest in 3 yrs.     Wart viral 2011       Past Surgical History:   Procedure Laterality Date     NO HISTORY OF SURGERY          Family History   Problem Relation Age of Onset     Neurologic Disorder Sister      Heart Disease Maternal Grandfather      Basal cell carcinoma Mother      Depression Paternal Aunt      Alcohol/Drug Paternal Uncle      Thyroid Disease Other         paternal cousin     Thyroid Disease Other        Social History     Socioeconomic History     Marital status:      Spouse name: Not on file     Number of children: 2     Years of education: Not on file     Highest education level: Not on file   Occupational History     Occupation:      Employer: [employer]     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   Social Needs     Financial resource strain: Not on file     Food insecurity:     Worry: Not on file     Inability: Not on file     Transportation needs:     Medical: Not on file     Non-medical: Not on file   Tobacco Use     Smoking status: Never Smoker     Smokeless tobacco: Never Used   Substance and Sexual Activity     Alcohol use: Yes     Alcohol/week: 0.5 oz     Comment: 2 drinks 2 times per week     Drug use: No     Sexual activity: Yes     Partners: Male     Birth control/protection:  Condom, Pill   Lifestyle     Physical activity:     Days per week: Not on file     Minutes per session: Not on file     Stress: Not on file   Relationships     Social connections:     Talks on phone: Not on file     Gets together: Not on file     Attends Caodaism service: Not on file     Active member of club or organization: Not on file     Attends meetings of clubs or organizations: Not on file     Relationship status: Not on file     Intimate partner violence:     Fear of current or ex partner: Not on file     Emotionally abused: Not on file     Physically abused: Not on file     Forced sexual activity: Not on file   Other Topics Concern     Parent/sibling w/ CABG, MI or angioplasty before 65F 55M? No   Social History Narrative    Caffeine intake/servings daily - 1    Calcium intake/servings daily - 1-2    Exercise 3 times weekly - describe walking, running    Sunscreen used - Yes    Seatbelts used - Yes    Guns stored in the home - No    Self Breast Exam - occ    Pap test up to date -  Last pap 5/13/2010    Eye exam up to date -  Yes    Dental exam up to date -  Yes    DEXA scan up to date -  Not Applicable    Flex Sig/Colonoscopy up to date -  Not Applicable    Mammography up to date -  Not Applicable    Immunizations reviewed and up to date - tdap 2008. Flu shot already    Abuse: Current or Past (Physical, Sexual or Emotional) - No    Do you feel safe in your environment - Yes    Do you cope well with stress - Yes    Do you suffer from insomnia - No    Last updated by: Shruti Eason MA.                               Outpatient Encounter Medications as of 6/25/2019   Medication Sig Dispense Refill     desogestrel-ethinyl estradiol (RECLIPSEN) 0.15-30 MG-MCG tablet Take 1 tablet by mouth daily 84 tablet 0     sertraline (ZOLOFT) 50 MG tablet Take 1/2 tablet per day for one week and then 1 tablet per day. 90 tablet 1     triamcinolone (KENALOG) 0.1 % external cream Apply topically 2 times daily to affected  areas on the chest for 10-14 days. Not for use on face nor groin. 30 g 1     No facility-administered encounter medications on file as of 6/25/2019.        Review Of Systems:  Skin: As above  Eyes: negative  Ears/Nose/Throat: negative  Respiratory: No shortness of breath, dyspnea on exertion, cough, or hemoptysis  Cardiovascular: negative  Gastrointestinal: negative  Genitourinary: negative  Musculoskeletal: negative  Neurologic: negative  Psychiatric: negative  Hematologic/Lymphatic/Immunologic: negative  Endocrine: negative      Objective:     /74   Pulse 64   Eyes: Conjunctivae/lids: Normal   ENT: Lips:  Normal  MSK: Normal  Cardiovascular: Peripheral edema none  Pulm: Breathing Normal  Neuro/Psych: Orientation: Normal; Mood/Affect: Normal, NAD, WDWN  Pt accompanied by: self  Following areas examined: face, right foot  Adrian skin type:i   Findings:  Dark brown smooth macules 3mm on right medial great toe nail. No nail dystrophy seen.  Assessment and Plan:  1) Neoplasm of uncertain behavior of right great toe nail  Disc biopsy to rule out MM  Disc possible etiologies including nevus, hematoma, and MM  Appt with Dr. Vance on 6/26/19 for nail biopsy    Signs and Symptoms of non-melanoma skin cancer and ABCDEs of melanoma.   Patient was asked about new or changing moles/lesions on body.   Pared down nail plate superior to pigment. After consent and prep, 15 blade used to pare down lesion. No complications and routine wound care.            Follow up in for nail biopsy and yearly FBE

## 2019-07-17 ENCOUNTER — TELEPHONE (OUTPATIENT)
Dept: OBGYN | Facility: CLINIC | Age: 44
End: 2019-07-17

## 2019-07-17 DIAGNOSIS — Z30.41 USES ORAL CONTRACEPTION: ICD-10-CM

## 2019-07-17 RX ORDER — DESOGESTREL AND ETHINYL ESTRADIOL 0.15-0.03
1 KIT ORAL DAILY
Qty: 112 TABLET | Refills: 3 | Status: SHIPPED | OUTPATIENT
Start: 2019-07-17 | End: 2020-03-19

## 2019-07-17 NOTE — TELEPHONE ENCOUNTER
Received message from pharmacy that pt would like to take OCP, desogestrel-ethinyl estradiol (RECLIPSEN) 0.15-30 MG-MCG tablet, continuously. Sig does not reflect this. Requesting new Rx with continuously. Routing to [facility] to review. OK to send.

## 2019-10-01 PROBLEM — M54.50 RIGHT LOW BACK PAIN: Status: ACTIVE | Noted: 2018-08-15

## 2019-10-03 ENCOUNTER — HEALTH MAINTENANCE LETTER (OUTPATIENT)
Age: 44
End: 2019-10-03

## 2020-03-19 DIAGNOSIS — Z30.41 USES ORAL CONTRACEPTION: ICD-10-CM

## 2020-03-19 RX ORDER — DESOGESTREL AND ETHINYL ESTRADIOL 0.15-0.03
1 KIT ORAL DAILY
Qty: 112 TABLET | Refills: 0 | Status: SHIPPED | OUTPATIENT
Start: 2020-03-19 | End: 2020-07-02

## 2020-03-19 NOTE — TELEPHONE ENCOUNTER
"Requested Prescriptions   Pending Prescriptions Disp Refills     desogestrel-ethinyl estradiol (RECLIPSEN) 0.15-30 MG-MCG tablet 112 tablet 3     Sig: Take 1 tablet by mouth daily       Contraceptives Protocol Failed - 3/19/2020  8:49 AM        Failed - Recent (12 mo) or future (30 days) visit within the authorizing provider's specialty     Patient has had an office visit with the authorizing provider or a provider within the authorizing providers department within the previous 12 mos or has a future within next 30 days. See \"Patient Info\" tab in inbasket, or \"Choose Columns\" in Meds & Orders section of the refill encounter.              Passed - Patient is not a current smoker if age is 35 or older        Passed - Medication is active on med list        Passed - No active pregnancy on record        Passed - No positive pregnancy test in past 12 months           Last Written Prescription Date:  7/17/19  Last Fill Quantity: 112,  # refills: 3   Last office visit: 2/8/19 with prescribing provider:  Trudy Arteaga   Future Office Visit:  none  Refilled for 3 months, will need OV for further refills  Alba Musa RN on 3/19/2020 at 8:58 AM    "

## 2020-07-02 ENCOUNTER — OFFICE VISIT (OUTPATIENT)
Dept: MIDWIFE SERVICES | Facility: CLINIC | Age: 45
End: 2020-07-02
Payer: COMMERCIAL

## 2020-07-02 ENCOUNTER — ANCILLARY PROCEDURE (OUTPATIENT)
Dept: MAMMOGRAPHY | Facility: CLINIC | Age: 45
End: 2020-07-02
Payer: COMMERCIAL

## 2020-07-02 VITALS
HEIGHT: 62 IN | BODY MASS INDEX: 22.63 KG/M2 | WEIGHT: 123 LBS | SYSTOLIC BLOOD PRESSURE: 120 MMHG | DIASTOLIC BLOOD PRESSURE: 76 MMHG

## 2020-07-02 DIAGNOSIS — Z12.31 VISIT FOR SCREENING MAMMOGRAM: ICD-10-CM

## 2020-07-02 DIAGNOSIS — Z01.419 ENCOUNTER FOR WELL WOMAN EXAM: Primary | ICD-10-CM

## 2020-07-02 DIAGNOSIS — Z30.41 USES ORAL CONTRACEPTION: ICD-10-CM

## 2020-07-02 DIAGNOSIS — Z01.419 ENCOUNTER FOR GYNECOLOGICAL EXAMINATION WITHOUT ABNORMAL FINDING: ICD-10-CM

## 2020-07-02 PROCEDURE — 77067 SCR MAMMO BI INCL CAD: CPT | Mod: TC

## 2020-07-02 PROCEDURE — 99396 PREV VISIT EST AGE 40-64: CPT | Performed by: ADVANCED PRACTICE MIDWIFE

## 2020-07-02 PROCEDURE — 77063 BREAST TOMOSYNTHESIS BI: CPT | Mod: TC

## 2020-07-02 RX ORDER — DESOGESTREL AND ETHINYL ESTRADIOL 0.15-0.03
1 KIT ORAL DAILY
Qty: 112 TABLET | Refills: 3 | Status: SHIPPED | OUTPATIENT
Start: 2020-07-02 | End: 2021-08-06

## 2020-07-02 ASSESSMENT — ANXIETY QUESTIONNAIRES
GAD7 TOTAL SCORE: 1
7. FEELING AFRAID AS IF SOMETHING AWFUL MIGHT HAPPEN: NOT AT ALL
3. WORRYING TOO MUCH ABOUT DIFFERENT THINGS: NOT AT ALL
6. BECOMING EASILY ANNOYED OR IRRITABLE: NOT AT ALL
2. NOT BEING ABLE TO STOP OR CONTROL WORRYING: NOT AT ALL
IF YOU CHECKED OFF ANY PROBLEMS ON THIS QUESTIONNAIRE, HOW DIFFICULT HAVE THESE PROBLEMS MADE IT FOR YOU TO DO YOUR WORK, TAKE CARE OF THINGS AT HOME, OR GET ALONG WITH OTHER PEOPLE: NOT DIFFICULT AT ALL
1. FEELING NERVOUS, ANXIOUS, OR ON EDGE: SEVERAL DAYS
5. BEING SO RESTLESS THAT IT IS HARD TO SIT STILL: NOT AT ALL

## 2020-07-02 ASSESSMENT — PATIENT HEALTH QUESTIONNAIRE - PHQ9
SUM OF ALL RESPONSES TO PHQ QUESTIONS 1-9: 0
5. POOR APPETITE OR OVEREATING: NOT AT ALL

## 2020-07-02 ASSESSMENT — MIFFLIN-ST. JEOR: SCORE: 1148.23

## 2020-07-02 NOTE — PROGRESS NOTES
"Chela is a 45 year old  female who presents for annual exam.     Besides routine health maintenance,  she would like to discuss IUD.  HPI:  Here today for annual exam, no other concerns. Chela has had an IUD in the past, currently taking OCPs continuously. She is wondering if she should switch back to IUD. She tolerated her IUD well, has minimal irregular bleeding. She states OCPs are also working well, denies ACHES, is using them continuously and gets a menstrual cycle \"a couple times a year\"  Had mammogram today  Menses are regular q 28-30 days and normal lasting 2 days.   Menses flow: normal and light.    Patient's last menstrual period was 2020 (exact date)..   Using oral contraceptives for contraception.    She is not currently considering pregnancy.    REPRODUCTIVE/GYNECOLOGIC HISTORY:  Chela is sexually active with male partner(s) and is currently in monogamous relationship.   STI testing offered?  Declined  History of abnormal Pap smear:  Yes,  ASCUS/neg HPV, last pap 2018, NILM  SOCIAL HISTORY  Abuse: does not report having previously been physical or sexually abused.    Do you feel safe in your environment? YES       She  reports that she has never smoked. She has never used smokeless tobacco.      Last PHQ-9 score on record =   PHQ-9 SCORE 2020   PHQ-9 Total Score -   PHQ-9 Total Score 0     Last GAD7 score on record =   ALMAS-7 SCORE 2020   Total Score -   Total Score 1     Alcohol Score = 3    HEALTH MAINTENANCE:  Cholesterol:   Cholesterol   Date Value Ref Range Status   2018 236 (H) <200 mg/dL Final     Comment:     Desirable:       <200 mg/dl   2012 204 (H) 0 - 200 mg/dL Final     Comment:     LDL Cholesterol is the primary guide to therapy.   The NCEP recommends further evaluation of: patients with cholesterol greater   than 200 mg/dL if additional risk factors are present, cholesterol greater   than   240 mg/dL, triglycerides greater than 150 mg/dL, or HDL " less than 40 mg/dL.    History of abnormal lipids: No  Mammo: Today . History of abnormal Mammo: No.  Regular Self Breast Exams: Yes  TSH:   TSH   Date Value Ref Range Status   2018 1.70 0.40 - 4.00 mU/L Final      Pap;   Lab Results   Component Value Date    PAP NIL 2018    PAP ASC-US 2015    PAP NIL 2011 WNL HPV neg (-)  Immunizations up to date: yes  (Gardasil, Tdap, Flu)  Health maintenance updated:  yes    HEALTHY HABITS  Eating habits: follows a balanced nutrition diet  Calcium/Vitamin D intake: source:  dairy Adequate? Likely no   Exercise: How often do you exercise? 3-5 times/week;Walking and Cardio  Have you had an eye exam in the last two years? YES   Do you routinely see the dentist once or twice yearly? YES     HISTORY:  OB History    Para Term  AB Living   2 2 2 0 0 2   SAB TAB Ectopic Multiple Live Births   0 0 0 0 2      # Outcome Date GA Lbr Maykel/2nd Weight Sex Delivery Anes PTL Lv   2 Term 08/15/04    M    MATTHIAS   1 Term 02    F    MATTHIAS     Past Medical History:   Diagnosis Date     ASCUS favor benign 2015    neg HPV Plan cotest in 3 yrs.     Wart viral      Past Surgical History:   Procedure Laterality Date     NO HISTORY OF SURGERY       Family History   Problem Relation Age of Onset     Neurologic Disorder Sister      Heart Disease Maternal Grandfather      Basal cell carcinoma Mother      Cancer Mother         BCC and SCC     Depression Paternal Aunt      Alcohol/Drug Paternal Uncle      Thyroid Disease Other         paternal cousin     Thyroid Disease Other      Social History     Socioeconomic History     Marital status:      Spouse name: Not on file     Number of children: 2     Years of education: Not on file     Highest education level: Not on file   Occupational History     Occupation:      Employer: [employer]     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   Social Needs     Financial resource strain: Not on file      Food insecurity     Worry: Not on file     Inability: Not on file     Transportation needs     Medical: Not on file     Non-medical: Not on file   Tobacco Use     Smoking status: Never Smoker     Smokeless tobacco: Never Used   Substance and Sexual Activity     Alcohol use: Yes     Alcohol/week: 0.8 standard drinks     Comment: 2 drinks 2 times per week     Drug use: No     Sexual activity: Yes     Partners: Male     Birth control/protection: Condom, Pill   Lifestyle     Physical activity     Days per week: Not on file     Minutes per session: Not on file     Stress: Not on file   Relationships     Social connections     Talks on phone: Not on file     Gets together: Not on file     Attends Tenriism service: Not on file     Active member of club or organization: Not on file     Attends meetings of clubs or organizations: Not on file     Relationship status: Not on file     Intimate partner violence     Fear of current or ex partner: Not on file     Emotionally abused: Not on file     Physically abused: Not on file     Forced sexual activity: Not on file   Other Topics Concern     Parent/sibling w/ CABG, MI or angioplasty before 65F 55M? No   Social History Narrative    Caffeine intake/servings daily - 1    Calcium intake/servings daily - 1-2    Exercise 3 times weekly - describe walking, running    Sunscreen used - Yes    Seatbelts used - Yes    Guns stored in the home - No    Self Breast Exam - occ    Pap test up to date -  Last pap 5/13/2010    Eye exam up to date -  Yes    Dental exam up to date -  Yes    DEXA scan up to date -  Not Applicable    Flex Sig/Colonoscopy up to date -  Not Applicable    Mammography up to date -  Not Applicable    Immunizations reviewed and up to date - tdap 2008. Flu shot already    Abuse: Current or Past (Physical, Sexual or Emotional) - No    Do you feel safe in your environment - Yes    Do you cope well with stress - Yes    Do you suffer from insomnia - No    Last updated by:  "Shruti Eason MA.                               Current Outpatient Medications:      desogestrel-ethinyl estradiol (RECLIPSEN) 0.15-30 MG-MCG tablet, Take 1 tablet by mouth daily, Disp: 112 tablet, Rfl: 3     Allergies   Allergen Reactions     Nkda [No Known Drug Allergies]        Past medical, surgical, social and family history were reviewed and updated in EPIC.    ROS:   12 point review of systems negative other than symptoms noted below or in the HPI.    PHYSICAL EXAM:  /76   Ht 1.562 m (5' 1.5\")   Wt 55.8 kg (123 lb)   LMP 06/30/2020 (Exact Date)   Breastfeeding No   BMI 22.86 kg/m     BMI: Body mass index is 22.86 kg/m .  Constitutional: healthy, alert and no distress  Neck: symmetrical, thyroid normal size, no masses present, no lymphadenopathy present.   Cardiovascular: RRR, no murmurs present  Respiratory: breathing unlabored, lungs CTA bilaterally  Breast:normal without masses, tenderness or nipple discharge and no palpable axillary masses or adenopathy  Gastrointestinal: abdomen soft, non-tender, bowel sounds present  PELVIC EXAM:  Vulva: No lesions, no adenopathy, BUS WNL  Vagina: Moist, pink, discharge normal  well rugated, no lesions  Cervix:smooth, neg CMT  Uterus: Normal size, non-tender, mobile  Ovaries: No masses palpated  Rectal exam: deferred    ASSESSMENT/PLAN:    ICD-10-CM    1. Encounter for well woman exam  Z01.419 Lipid panel reflex to direct LDL Fasting     TSH with free T4 reflex     Comprehensive metabolic panel   2. Encounter for gynecological examination without abnormal finding  Z01.419    3. Uses oral contraception  Z30.41 desogestrel-ethinyl estradiol (RECLIPSEN) 0.15-30 MG-MCG tablet     Results for orders placed or performed in visit on 07/02/20   MA Screen Bilateral w/Jeffery     Status: None    Narrative    SCREENING MAMMOGRAM, BILATERAL, DIGITAL w/CAD AND TOMOSYNTHESIS -  7/2/2020 1:15 PM.    BREAST SYMPTOMS: No current breast complaints.     COMPARISON:  2/8/19, " 2/2/18.    BREAST DENSITY: Heterogeneously dense.    COMMENTS: No findings of suspicion for malignancy.       Impression    IMPRESSION: BI-RADS CATEGORY: 1 - Negative.    RECOMMENDED FOLLOW-UP: Annual Mammography.  Recommend routine annual screening mammography.    Exam results letter mailed to patient.    JOHNNY OTERO MD         COUNSELING:   Reviewed preventive health counseling, as reflected in patient instructions       Regular exercise       Healthy diet/nutrition       Contraception   reports that she has never smoked. She has never used smokeless tobacco.    -Reviewed r/b/a to IUDs. Handouts given for Noe Hinson [facility]ag[facility]. Pt to call clinic if she desires IUD placement  -Refill for OCPs sent to pharamcy  -Lipid panel, CMP, TSH with T4 reflex futured. Pt to make lab only appt for lab draw  -Pap due 2/2021  -RTC in February 2021 for pap or sooner if problems arise.     MANUEL Sweet, CNM

## 2020-07-03 ASSESSMENT — ANXIETY QUESTIONNAIRES: GAD7 TOTAL SCORE: 1

## 2020-07-21 ENCOUNTER — OFFICE VISIT (OUTPATIENT)
Dept: FAMILY MEDICINE | Facility: CLINIC | Age: 45
End: 2020-07-21
Payer: COMMERCIAL

## 2020-07-21 VITALS
RESPIRATION RATE: 16 BRPM | SYSTOLIC BLOOD PRESSURE: 106 MMHG | WEIGHT: 122.8 LBS | HEIGHT: 62 IN | DIASTOLIC BLOOD PRESSURE: 68 MMHG | HEART RATE: 68 BPM | TEMPERATURE: 99.1 F | BODY MASS INDEX: 22.6 KG/M2 | OXYGEN SATURATION: 100 %

## 2020-07-21 DIAGNOSIS — Z01.419 ENCOUNTER FOR WELL WOMAN EXAM: ICD-10-CM

## 2020-07-21 DIAGNOSIS — H92.01 RIGHT EAR PAIN: Primary | ICD-10-CM

## 2020-07-21 LAB
ALBUMIN SERPL-MCNC: 3.3 G/DL (ref 3.4–5)
ALP SERPL-CCNC: 56 U/L (ref 40–150)
ALT SERPL W P-5'-P-CCNC: 23 U/L (ref 0–50)
ANION GAP SERPL CALCULATED.3IONS-SCNC: 6 MMOL/L (ref 3–14)
AST SERPL W P-5'-P-CCNC: 20 U/L (ref 0–45)
BILIRUB SERPL-MCNC: 0.3 MG/DL (ref 0.2–1.3)
BUN SERPL-MCNC: 9 MG/DL (ref 7–30)
CALCIUM SERPL-MCNC: 9.1 MG/DL (ref 8.5–10.1)
CHLORIDE SERPL-SCNC: 108 MMOL/L (ref 94–109)
CHOLEST SERPL-MCNC: 216 MG/DL
CO2 SERPL-SCNC: 23 MMOL/L (ref 20–32)
CREAT SERPL-MCNC: 0.81 MG/DL (ref 0.52–1.04)
GFR SERPL CREATININE-BSD FRML MDRD: 87 ML/MIN/{1.73_M2}
GLUCOSE SERPL-MCNC: 83 MG/DL (ref 70–99)
HDLC SERPL-MCNC: 103 MG/DL
LDLC SERPL CALC-MCNC: 86 MG/DL
NONHDLC SERPL-MCNC: 113 MG/DL
POTASSIUM SERPL-SCNC: 4.6 MMOL/L (ref 3.4–5.3)
PROT SERPL-MCNC: 7.5 G/DL (ref 6.8–8.8)
SODIUM SERPL-SCNC: 137 MMOL/L (ref 133–144)
TRIGL SERPL-MCNC: 133 MG/DL
TSH SERPL DL<=0.005 MIU/L-ACNC: 1.3 MU/L (ref 0.4–4)

## 2020-07-21 PROCEDURE — 36415 COLL VENOUS BLD VENIPUNCTURE: CPT | Performed by: ADVANCED PRACTICE MIDWIFE

## 2020-07-21 PROCEDURE — 80061 LIPID PANEL: CPT | Performed by: ADVANCED PRACTICE MIDWIFE

## 2020-07-21 PROCEDURE — 99213 OFFICE O/P EST LOW 20 MIN: CPT | Performed by: FAMILY MEDICINE

## 2020-07-21 PROCEDURE — 80053 COMPREHEN METABOLIC PANEL: CPT | Performed by: ADVANCED PRACTICE MIDWIFE

## 2020-07-21 PROCEDURE — 84443 ASSAY THYROID STIM HORMONE: CPT | Performed by: ADVANCED PRACTICE MIDWIFE

## 2020-07-21 RX ORDER — NEOMYCIN SULFATE, POLYMYXIN B SULFATE AND HYDROCORTISONE 10; 3.5; 1 MG/ML; MG/ML; [USP'U]/ML
4 SUSPENSION/ DROPS AURICULAR (OTIC) 4 TIMES DAILY
Qty: 10 ML | Refills: 0 | Status: SHIPPED | OUTPATIENT
Start: 2020-07-21 | End: 2020-07-31

## 2020-07-21 ASSESSMENT — MIFFLIN-ST. JEOR: SCORE: 1147.33

## 2020-07-21 NOTE — RESULT ENCOUNTER NOTE
Federico York,    Your labs results look great. As always, your overall cholesterol looks high, but it is because your HDL (good cholesterol) is so elevated. No follow up is needed. Please contact us with any questions or concerns.     Thank you,  Dilma Mcfarland, DNP, APRN, CNM

## 2020-07-21 NOTE — PROGRESS NOTES
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     Started with ear canal pain but then started getting some tenderness around ear.   Hard to sleep on that side.   Jaw pain that improved.   Ear pain came back.  No swimming.  No qtip use.   No ear tubes and ear infection as a child.   No smoking.   Hearing is fine.   No tinnitus.    Reviewed and updated as needed this visit by Provider    Review of Systems   Constitutional, HEENT, cardiovascular, pulmonary, gi and gu systems are negative, except as otherwise noted.      Objective    /68 (BP Location: Right arm, Patient Position: Sitting, Cuff Size: Adult Regular)   Pulse 68   Temp 99.1  F (37.3  C) (Oral)   Resp 16   Ht 1.562 m (5' 1.5\")   Wt 55.7 kg (122 lb 12.8 oz)   LMP 06/30/2020 (Exact Date)   SpO2 100%   BMI 22.83 kg/m    Body mass index is 22.83 kg/m .  Physical Exam   GENERAL: healthy, alert and no distress  EYES: Eyes grossly normal to inspection, PERRL and conjunctivae and sclerae normal  HENT: ear canals and TM's normal, nose and mouth without ulcers or lesions  SKIN: no suspicious lesions or rashes on affected area  NEURO: Normal strength and tone, mentation intact and speech normal  PSYCH: mentation appears normal, affect normal/bright       Assessment & Plan     1. Right ear pain  Some symptoms of otitis externa but exam is fairly benign.  I think it is still okay to try topical Cortisporin otic and see how she does.  She does not have other " red flag symptoms and no other risk factors.  We discussed if her symptom fails to improve in a week she can call us to get an ENT referral.  - neomycin-polymyxin-hydrocortisone (CORTISPORIN) 3.5-91988-5 otic suspension; Place 4 drops into the right ear 4 times daily for 10 days  Dispense: 10 mL; Refill: 0     Regulo Wiley Haider MD, MD  [facility]

## 2020-08-12 ENCOUNTER — OFFICE VISIT (OUTPATIENT)
Dept: URGENT CARE | Facility: URGENT CARE | Age: 45
End: 2020-08-12
Payer: COMMERCIAL

## 2020-08-12 ENCOUNTER — VIRTUAL VISIT (OUTPATIENT)
Dept: FAMILY MEDICINE | Facility: OTHER | Age: 45
End: 2020-08-12
Payer: COMMERCIAL

## 2020-08-12 ENCOUNTER — MYC MEDICAL ADVICE (OUTPATIENT)
Dept: FAMILY MEDICINE | Facility: CLINIC | Age: 45
End: 2020-08-12

## 2020-08-12 VITALS
TEMPERATURE: 98.5 F | OXYGEN SATURATION: 100 % | DIASTOLIC BLOOD PRESSURE: 69 MMHG | HEART RATE: 61 BPM | WEIGHT: 122 LBS | SYSTOLIC BLOOD PRESSURE: 113 MMHG | HEIGHT: 62 IN | BODY MASS INDEX: 22.45 KG/M2

## 2020-08-12 DIAGNOSIS — H69.91 DYSFUNCTION OF RIGHT EUSTACHIAN TUBE: Primary | ICD-10-CM

## 2020-08-12 PROCEDURE — 99421 OL DIG E/M SVC 5-10 MIN: CPT | Performed by: NURSE PRACTITIONER

## 2020-08-12 PROCEDURE — 99213 OFFICE O/P EST LOW 20 MIN: CPT | Performed by: INTERNAL MEDICINE

## 2020-08-12 RX ORDER — FLUTICASONE PROPIONATE 50 MCG
1 SPRAY, SUSPENSION (ML) NASAL 2 TIMES DAILY
Qty: 16 G | Refills: 0 | Status: SHIPPED | OUTPATIENT
Start: 2020-08-12 | End: 2020-08-14

## 2020-08-12 ASSESSMENT — ENCOUNTER SYMPTOMS
FEVER: 0
RHINORRHEA: 0
FATIGUE: 1
HEADACHES: 1

## 2020-08-12 ASSESSMENT — MIFFLIN-ST. JEOR: SCORE: 1143.7

## 2020-08-12 NOTE — PROGRESS NOTES
"Date: 2020 09:12:02  Clinician: Negar Jacobs  Clinician NPI: [id number]  Patient: Chela Villatoro  Patient : 1975  Patient Address: [address] [place], [place] [id number]  Patient Phone: [phone]  Visit Protocol: URI  Patient Summary:  Chela is a 45 year old ( : 1975 ) female who initiated a Visit for COVID-19 (Coronavirus) evaluation and screening. When asked the question \"Please sign me up to receive news, health information and promotions from [employer].\", Chela responded \"No\".    Chela states her symptoms started gradually 3-4 days ago.   Her symptoms consist of ear pain, a headache, enlarged lymph nodes, a sore throat, diarrhea, nasal congestion, and malaise.   Symptom details     Nasal secretions: The color of her mucus is yellow.    Sore throat: Chela reports having mild throat pain (1-3 on a 10 point pain scale), does not have exudate on her tonsils, and can swallow liquids. The lymph nodes in her neck are enlarged. A rash has not appeared on the skin since the sore throat started.     Headache: She states the headache is moderate (4-6 on a 10 point pain scale).      Chela denies having chills, nausea, teeth pain, ageusia, cough, vomiting, rhinitis, myalgias, anosmia, facial pain or pressure, fever, and wheezing. She also denies taking antibiotic medication in the past month, double sickening (worsening symptoms after initial improvement), and having recent facial or sinus surgery in the past 60 days. She is not experiencing dyspnea.   Precipitating events  Within the past week, Chela has not been exposed to someone with strep throat.   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   She either works or volunteers as a healthcare worker or a , or works or volunteers in a healthcare facility. She provides direct patient care. 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 Last Friday I saw a patient who has now tested positive for Covid.   Chela has not lived in a congregate living setting in the past 14 days. She lives with a healthcare worker.   Chela has had a close contact with a laboratory-confirmed COVID-19 patient within 14 days of symptom onset.   Since December 2019, Chela and has not had upper respiratory infection or influenza-like illness. Has not been diagnosed with lab-confirmed COVID-19 test   Pertinent medical history  Chela had 1 sinus infection within the past year.   Chela does not get yeast infections when she takes antibiotics.   Chela does not need a return to work/school note.   Weight: 125 lbs   Chela does not smoke or use smokeless tobacco.   She denies pregnancy and denies breastfeeding. She has menstruated in the past month.   Weight: 125 lbs    MEDICATIONS: No current medications, ALLERGIES: NKDA  Clinician Response:  Dear Chela,         Your symptoms show that you may have coronavirus (COVID-19). This&nbsp;illness can cause fever, cough and trouble breathing. Many people get a mild case and get better on their own. Some people can get very sick.  What should I do?  We would like to test you for this virus.  1. Please call [phone] to schedule your visit. Explain that you were referred by [facility] to have a COVID-19 test. Be ready to share your [facility] visit ID number.  The following will serve as your written order for this COVID Test, ordered by me, for the indication of suspected COVID [Z20.828]: The test will be ordered in [employer], our electronic health record, after you are scheduled. It will show as ordered and authorized by Lalo Roldan MD.  Order: COVID-19 (Coronavirus) PCR for SYMPTOMATIC testing from [facility].    2. When it's time for your COVID test:  Stay at least 6 feet away from others. (If someone will drive you to your test, stay in the  "backseat, as far away from the  as you can.)  Cover your mouth and nose with a mask, tissue or washcloth.  Go straight to the testing site. Don't make any stops on the way there or back.    3.Starting now:&nbsp;Stay home and away from others (self-isolate) until:   You've had&nbsp;no&nbsp;fever---and no medicine that reduces fever---for one full day (24 hours).&nbsp;And...  Your other symptoms have gotten better. For example, your cough or breathing has improved.&nbsp;And...  At least&nbsp;10 days&nbsp;have passed since your symptoms started.    During this time, don't leave the house except for testing or medical care.   Stay in your own room, even for meals. Use your own bathroom if you can.  Stay away from others in your home. No hugging, kissing or shaking hands. No visitors.  Don't go to work, school or anywhere else.   Clean \"high touch\" surfaces often (doorknobs, counters, handles, etc.). Use a household cleaning spray or wipes. You'll find a full list of  on the EPA website:&nbsp;www.epa.gov/pesticide-registration/list-n-disinfectants-use-against-sars-cov-2.   Cover your mouth and nose with a mask, tissue or washcloth to avoid spreading germs.  Wash your hands and face often. Use soap and water.  Caregivers in these groups are at risk for severe illness due to COVID-19:  o People 65 years and older  o People who live in a nursing home or long-term care facility  o People with chronic disease (lung, heart, cancer, diabetes, kidney, liver, immunologic)  o People who have a weakened immune system, including those who:   Are in cancer treatment  Take medicine that weakens the immune system, such as corticosteroids  Had a bone marrow or organ transplant  Have an immune deficiency  Have poorly controlled HIV or AIDS  Are obese (body mass index of 40 or higher)  Smoke regularly   o Caregivers should wear gloves while washing dishes, handling laundry and cleaning bedrooms and bathrooms.  o Use caution " when washing and drying laundry: Don't shake dirty laundry, and use the warmest water setting that you can.  o For more tips, go to&nbsp;www.cdc.gov/coronavirus/2019-ncov/downloads/10Things.pdf.   How can I take care of myself?    Get lots of rest. Drink extra fluids&nbsp;(unless a doctor has told you not to).  Take Tylenol (acetaminophen) for fever or pain.&nbsp;If you have liver or kidney problems, ask your family doctor if it's okay to take Tylenol.   Adults can take either:   650 mg (two 325 mg pills) every 4 to 6 hours,&nbsp;or...  1,000 mg (two 500 mg pills) every 8 hours as needed.  Note:&nbsp;Don't take more than 3,000 mg in one day. Acetaminophen is found in many medicines (both prescribed and over-the-counter medicines). Read all labels to be sure you don't take too much.   For children, check the Tylenol bottle for the right dose. The dose is based on the child's age or weight.   If you have other health problems (like cancer, heart failure, an organ transplant or severe kidney disease):&nbsp;Call your specialty clinic if you don't feel better in the next 2 days.    Know when to call 911.&nbsp;Emergency warning signs include:   Trouble breathing or shortness of breath Pain or pressure in the chest that doesn't go away Feeling confused like you haven't felt before, or not being able to wake up Bluish-colored lips or face.  Where can I get more information?    [facility] -- About COVID-19:&nbsp;[email or web address]  CDC -- What to Do If You're Sick:&nbsp;www.cdc.gov/coronavirus/2019-ncov/about/steps-when-sick.html  CDC -- Ending Home Isolation:&nbsp;www.cdc.gov/coronavirus/2019-ncov/hcp/disposition-in-home-patients.html  CDC -- Caring for Someone:&nbsp;www.cdc.gov/coronavirus/2019-ncov/if-you-are-sick/care-for-someone.html  [facility] -- Interim Guidance for Hospital Discharge to Home:&nbsp;www.[facility].mn.us/diseases/coronavirus/hcp/hospdischarge.pdf  [facility] clinical trials  (COVID-19 research studies):&nbsp;clinicalaffairs.[facility]-clinical-trials  Below are the COVID-19 hotlines at the [place] Department of Health ([facility]). Interpreters are available.   For health questions: Call 467-981-4471 or 1-446.198.8758 (7 a.m. to 7 p.m.) For questions about schools and childcare: Call 747-794-0332 or 1-981.458.9708 (7 a.m. to 7 p.m.)           Diagnosis: Other malaise  Diagnosis ICD: R53.81

## 2020-08-12 NOTE — PATIENT INSTRUCTIONS
Exam looks good  Slight bulge TM with air.  flonase 2 x day 2 weeks    See Otolaryngology if symptoms not resolved.      Patient Education     Anatomy of the Ear    The ear is a complex and delicate organ. It collects sound waves so you can hear the world around you. The ear also has a second function--it helps you keep your balance. Your ear can be divided into 3 parts. The outer ear and middle ear help collect and amplify sound. The inner ear converts sound waves to messages that are sent to the brain. The inner ear also senses the movement and position of your head and body so you can maintain your balance and see clearly, even when you change positions.  The mastoid bone surrounds the middle ear. The external ear collects sound waves. The ear canal carries sound waves to the eardrum. The eardrum vibrates from sound waves, setting the middle ear bones in motion. The middle ear bones (ossicles) vibrate, transmitting sound waves to the inner ear. When the ear is healthy, air pressure remains balanced in the middle ear. The eustachian tube helps control air pressure in the middle ear. The semicircular canals help maintain balance. The vestibular nerve carries balance signals to the brain. The auditory nerve carries sound signals to the brain. The cochlea picks up sound waves and makes nerve signals.     Date Last Reviewed: 10/1/2016    6961-4156 [employer]. [address] [place] [address], [place] [address]. All rights reserved. This information is not intended as a substitute for professional medical care. Always follow your healthcare professional's instructions.

## 2020-08-12 NOTE — PROGRESS NOTES
SUBJECTIVE:   Chela Villatoro is a 45 year old female presenting with a chief complaint of   Chief Complaint   Patient presents with     Urgent Care     Ear Problem     plugged right ears for about 1 week, has a lot of pressure, headache, feeling tired. Was here a couple weeks ago for ear pain in the same ear and completed ear drops.       She is an established patient of [facility].    7/21 seen & given antibiotics ear drops for  Pain  CORTISPORIN    Now ear feels full & congested    symptoms persistent since initial evaluation  Treatment measures tried include Decongestants.  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 Negative for fever.   HENT: Negative for rhinorrhea.    Allergic/Immunologic: Negative for environmental allergies.   Neurological: Positive for headaches.       Past Medical History:   Diagnosis Date     ASCUS favor benign 1/2015    neg HPV Plan cotest in 3 yrs.     Wart viral 2011     Family History   Problem Relation Age of Onset     Neurologic Disorder Sister      Heart Disease Maternal Grandfather      Basal cell carcinoma Mother      Cancer Mother         BCC and SCC     Depression Paternal Aunt      Alcohol/Drug Paternal Uncle      Thyroid Disease Other         paternal cousin     Thyroid Disease Other      Current Outpatient Medications   Medication Sig Dispense Refill     desogestrel-ethinyl estradiol (RECLIPSEN) 0.15-30 MG-MCG tablet Take 1 tablet by mouth daily 112 tablet 3     fluticasone (FLONASE) 50 MCG/ACT nasal spray Spray 1 spray into both nostrils 2 times daily for 2 days 16 g 0     Social History     Tobacco Use     Smoking status: Never Smoker     Smokeless tobacco: Never Used   Substance Use Topics     Alcohol use: Yes     Alcohol/week: 0.8 standard drinks     Comment: 2 drinks 2 times per week       OBJECTIVE  /69 (BP Location: Right arm, Patient Position: Sitting, Cuff  "Size: Adult Regular)   Pulse 61   Temp 98.5  F (36.9  C) (Oral)   Ht 1.562 m (5' 1.5\")   Wt 55.3 kg (122 lb)   SpO2 100%   BMI 22.68 kg/m      Physical Exam  Vitals signs reviewed.   Constitutional:       Appearance: Normal appearance.   HENT:      Right Ear: Tympanic membrane and ear canal normal. There is no impacted cerumen.      Left Ear: Tympanic membrane and ear canal normal. There is no impacted cerumen.      Ears:      Comments: No tragus & TMJ pain    right TM - could be bulging with air  Neurological:      Mental Status: She is alert.         Labs:  No results found for this or any previous visit (from the past 24 hour(s)).        ASSESSMENT:      ICD-10-CM    1. Dysfunction of right eustachian tube  H69.81 fluticasone (FLONASE) 50 MCG/ACT nasal spray     OTOLARYNGOLOGY REFERRAL        Medical Decision Making:      Patient Instructions   Exam looks good  Slight bulge TM with air.  flonase 2 x day 2 weeks    See Otolaryngology if symptoms not resolved.      Patient Education     Anatomy of the Ear    The ear is a complex and delicate organ. It collects sound waves so you can hear the world around you. The ear also has a second function--it helps you keep your balance. Your ear can be divided into 3 parts. The outer ear and middle ear help collect and amplify sound. The inner ear converts sound waves to messages that are sent to the brain. The inner ear also senses the movement and position of your head and body so you can maintain your balance and see clearly, even when you change positions.  The mastoid bone surrounds the middle ear. The external ear collects sound waves. The ear canal carries sound waves to the eardrum. The eardrum vibrates from sound waves, setting the middle ear bones in motion. The middle ear bones (ossicles) vibrate, transmitting sound waves to the inner ear. When the ear is healthy, air pressure remains balanced in the middle ear. The eustachian tube helps control air pressure " in the middle ear. The semicircular canals help maintain balance. The vestibular nerve carries balance signals to the brain. The auditory nerve carries sound signals to the brain. The cochlea picks up sound waves and makes nerve signals.     Date Last Reviewed: 10/1/2016    2541-3016 [employer]. [address] [place] [address]. All rights reserved. This information is not intended as a substitute for professional medical care. Always follow your healthcare professional's instructions.

## 2020-08-16 ENCOUNTER — MYC MEDICAL ADVICE (OUTPATIENT)
Dept: FAMILY MEDICINE | Facility: CLINIC | Age: 45
End: 2020-08-16

## 2020-08-17 ENCOUNTER — COMMUNICATION - HEALTHEAST (OUTPATIENT)
Dept: SCHEDULING | Facility: CLINIC | Age: 45
End: 2020-08-17

## 2020-08-17 ENCOUNTER — NURSE TRIAGE (OUTPATIENT)
Dept: NURSING | Facility: CLINIC | Age: 45
End: 2020-08-17

## 2020-09-18 NOTE — TELEPHONE ENCOUNTER
FUTURE VISIT INFORMATION      FUTURE VISIT INFORMATION:    Date: 12/22/2020    Time: 2:30M    Location: [facility]  REFERRAL INFORMATION:    Referring provider:  Amanda Travis MD    Referring providers clinic:  [facility]    Reason for visit/diagnosis  Dysfunction of right eustachian tube ; referred by Amanda Travis,; referral in [facility]; scheduled per Pt    RECORDS REQUESTED FROM:       Clinic name Comments Records Status Imaging Status   [facility] 8/12/2020 referral and note from Amanda Travis MD [facility] 7/21/2020 note from Dr Regulo Haider Epic

## 2020-10-19 ENCOUNTER — OFFICE VISIT (OUTPATIENT)
Dept: URGENT CARE | Facility: URGENT CARE | Age: 45
End: 2020-10-19
Payer: COMMERCIAL

## 2020-10-19 ENCOUNTER — RESULTS ONLY (OUTPATIENT)
Dept: LAB | Age: 45
End: 2020-10-19

## 2020-10-19 DIAGNOSIS — Z53.9 ERRONEOUS ENCOUNTER--DISREGARD: Primary | ICD-10-CM

## 2020-10-19 LAB
SARS-COV-2 RNA SPEC QL NAA+PROBE: NORMAL
SPECIMEN SOURCE: NORMAL

## 2020-10-20 LAB
LABORATORY COMMENT REPORT: NORMAL
SARS-COV-2 RNA SPEC QL NAA+PROBE: NEGATIVE
SPECIMEN SOURCE: NORMAL

## 2020-10-28 ENCOUNTER — OFFICE VISIT (OUTPATIENT)
Dept: URGENT CARE | Facility: URGENT CARE | Age: 45
End: 2020-10-28
Payer: COMMERCIAL

## 2020-10-28 ENCOUNTER — RESULTS ONLY (OUTPATIENT)
Dept: LAB | Age: 45
End: 2020-10-28

## 2020-10-28 DIAGNOSIS — Z53.9 ERRONEOUS ENCOUNTER--DISREGARD: Primary | ICD-10-CM

## 2020-10-28 LAB
SARS-COV-2 RNA SPEC QL NAA+PROBE: NORMAL
SPECIMEN SOURCE: NORMAL

## 2020-11-04 ENCOUNTER — OFFICE VISIT (OUTPATIENT)
Dept: URGENT CARE | Facility: URGENT CARE | Age: 45
End: 2020-11-04
Payer: COMMERCIAL

## 2020-11-04 ENCOUNTER — RESULTS ONLY (OUTPATIENT)
Dept: LAB | Age: 45
End: 2020-11-04

## 2020-11-04 DIAGNOSIS — Z53.9 ERRONEOUS ENCOUNTER--DISREGARD: Primary | ICD-10-CM

## 2020-11-04 LAB
SARS-COV-2 RNA SPEC QL NAA+PROBE: NORMAL
SPECIMEN SOURCE: NORMAL

## 2020-11-12 ENCOUNTER — RESULTS ONLY (OUTPATIENT)
Dept: LAB | Age: 45
End: 2020-11-12

## 2020-11-12 ENCOUNTER — OFFICE VISIT (OUTPATIENT)
Dept: URGENT CARE | Facility: URGENT CARE | Age: 45
End: 2020-11-12
Payer: COMMERCIAL

## 2020-11-12 DIAGNOSIS — Z53.9 ERRONEOUS ENCOUNTER--DISREGARD: Primary | ICD-10-CM

## 2020-11-12 LAB
LABORATORY COMMENT REPORT: NORMAL
SARS-COV-2 RNA SPEC QL NAA+PROBE: NEGATIVE
SARS-COV-2 RNA SPEC QL NAA+PROBE: NORMAL
SPECIMEN SOURCE: NORMAL
SPECIMEN SOURCE: NORMAL

## 2020-12-22 ENCOUNTER — PRE VISIT (OUTPATIENT)
Dept: OTOLARYNGOLOGY | Facility: CLINIC | Age: 45
End: 2020-12-22

## 2021-03-18 ENCOUNTER — TRANSFERRED RECORDS (OUTPATIENT)
Dept: MULTI SPECIALTY CLINIC | Facility: CLINIC | Age: 46
End: 2021-03-18

## 2021-03-18 LAB
HPV ABSTRACT: NORMAL
PAP-ABSTRACT: NORMAL

## 2021-06-10 NOTE — TELEPHONE ENCOUNTER
Call from pt         COVID-19 PCR Results     COVID-19 PCR Results  8/12/20    2019-nCOV  Not Detected        Coronavirus (COVID-19) Notification    Lab Result   Lab test 2019-nCoV rRt-PCR OR SARS-COV-2 PCR    Nasopharyngeal AND/OR Oropharyngeal swab is NEGATIVE for 2019-nCoV RNA [OR] SARS-COV-2 RNA (COVID-19) RNA    Your result was negative. This means that we didn't find the virus that causes COVID-19 in your sample. A test may show negative when you do actually have the virus. This can happen when the virus is in the early stages of infection, before you feel illness symptoms.    If you have symptoms   Stay home and away from others (self-isolate) until you meet ALL of the guidelines below:    You've had no fever--and no medicine that reduces fever--for 3 full days (72 hours). And      Your other symptoms have gotten better. For example, your cough or breathing has improved. And     At least 10 days have passed since your symptoms started.    During this time:    Stay home. Don't go to work, school or anywhere else.     Stay in your own room, including for meals. Use your own bathroom if you can.    Stay away from others in your home. No hugging, kissing or shaking hands. No visitors.    Clean  high touch  surfaces often (doorknobs, counters, handles, etc.). Use a household cleaning spray or wipes. You can find a full list on the EPA website at www.epa.gov/pesticide-registration/list-n-disinfectants-use-against-sars-cov-2.    Cover your mouth and nose with a mask, tissue or washcloth to avoid spreading germs.    Wash your hands and face often with soap and water.    Going back to work  Check with your employer for any guidelines to follow for going back to work.  You are sent a letter for your Employer which will serve as formal document notice that you, the employee, tested negative for COVID-19, as of the testing date shown above.    If your symptoms worsen or other concerning symptoms, contact PCP, oncare or  consider returning to Emergency Dept.    Where can I get more information?    [facility]: [email or web address]    Coronavirus Basics: www.health.[facility].mn.us/diseases/coronavirus/basics.html    [facility] Hotline (638-526-7939)    Hamilton Carrera RN     Additional Information    [1] Follow-up call to recent contact AND [2] information only call, no triage required    Negative: [1] Caller is not with the adult (patient) AND [2] probable NON-URGENT symptoms    Negative: Question about upcoming scheduled test, no triage required and triager able to answer question    Negative: Health Information question, no triage required and triager able to answer question    Negative: Requesting regular office appointment    Negative: [1] Caller requesting NON-URGENT health information AND [2] PCP's office is the best resource    Negative: General information question, no triage required and triager able to answer question    Negative: RN needs further essential information from caller in order to complete triage    Negative: [1] Caller is not with the adult (patient) AND [2] reporting urgent symptoms    Negative: Lab result questions    Negative: Medication questions    Negative: Caller can't be reached by phone    Negative: Caller has already spoken to PCP or another triager    Protocols used: INFORMATION ONLY CALL-A-

## 2021-08-06 DIAGNOSIS — Z30.41 USES ORAL CONTRACEPTION: ICD-10-CM

## 2021-08-06 RX ORDER — DESOGESTREL AND ETHINYL ESTRADIOL 0.15-0.03
1 KIT ORAL DAILY
Qty: 28 TABLET | Refills: 0 | Status: SHIPPED | OUTPATIENT
Start: 2021-08-06 | End: 2023-03-16

## 2021-08-06 NOTE — TELEPHONE ENCOUNTER
"Requested Prescriptions   Pending Prescriptions Disp Refills     desogestrel-ethinyl estradiol (RECLIPSEN) 0.15-30 MG-MCG tablet 112 tablet 3     Sig: Take 1 tablet by mouth daily       Contraceptives Protocol Failed - 8/6/2021 10:15 AM        Failed - Recent (12 mo) or future (30 days) visit within the authorizing provider's specialty     Patient has had an office visit with the authorizing provider or a provider within the authorizing providers department within the previous 12 mos or has a future within next 30 days. See \"Patient Info\" tab in inbasket, or \"Choose Columns\" in Meds & Orders section of the refill encounter.              Passed - Patient is not a current smoker if age is 35 or older        Passed - Medication is active on med list        Passed - No active pregnancy on record        Passed - No positive pregnancy test in past 12 months           Last Written Prescription Date:  7/2/20  Last Fill Quantity: 112,  # refills: 3   Last office visit: 7/2/2020 with prescribing provider:  Amina Singh   Future Office Visit:  none    Prescription approved per [facility] Refill Protocol.  Appointment needed for further refills    Alba Musa RN on 8/6/2021 at 12:01 PM        "

## 2021-09-05 ENCOUNTER — HEALTH MAINTENANCE LETTER (OUTPATIENT)
Age: 46
End: 2021-09-05

## 2022-10-29 ENCOUNTER — HEALTH MAINTENANCE LETTER (OUTPATIENT)
Age: 47
End: 2022-10-29

## 2022-11-04 ENCOUNTER — LAB REQUISITION (OUTPATIENT)
Dept: LAB | Facility: CLINIC | Age: 47
End: 2022-11-04

## 2022-11-04 PROCEDURE — 86481 TB AG RESPONSE T-CELL SUSP: CPT | Performed by: INTERNAL MEDICINE

## 2022-11-07 LAB
GAMMA INTERFERON BACKGROUND BLD IA-ACNC: 0.06 IU/ML
M TB IFN-G BLD-IMP: NEGATIVE
M TB IFN-G CD4+ BCKGRND COR BLD-ACNC: 9.94 IU/ML
MITOGEN IGNF BCKGRD COR BLD-ACNC: 0.05 IU/ML
MITOGEN IGNF BCKGRD COR BLD-ACNC: 0.11 IU/ML
QUANTIFERON MITOGEN: 10 IU/ML
QUANTIFERON NIL TUBE: 0.06 IU/ML
QUANTIFERON TB1 TUBE: 0.17 IU/ML
QUANTIFERON TB2 TUBE: 0.11

## 2022-11-12 ENCOUNTER — TRANSFERRED RECORDS (OUTPATIENT)
Dept: MULTI SPECIALTY CLINIC | Facility: CLINIC | Age: 47
End: 2022-11-12

## 2023-03-16 ENCOUNTER — OFFICE VISIT (OUTPATIENT)
Dept: FAMILY MEDICINE | Facility: CLINIC | Age: 48
End: 2023-03-16
Payer: COMMERCIAL

## 2023-03-16 VITALS
TEMPERATURE: 99 F | WEIGHT: 136 LBS | HEIGHT: 61 IN | RESPIRATION RATE: 16 BRPM | OXYGEN SATURATION: 99 % | HEART RATE: 59 BPM | DIASTOLIC BLOOD PRESSURE: 70 MMHG | SYSTOLIC BLOOD PRESSURE: 114 MMHG | BODY MASS INDEX: 25.68 KG/M2

## 2023-03-16 DIAGNOSIS — Z13.1 SCREENING FOR DIABETES MELLITUS: ICD-10-CM

## 2023-03-16 DIAGNOSIS — Z12.83 SKIN CANCER SCREENING: ICD-10-CM

## 2023-03-16 DIAGNOSIS — Z00.00 ROUTINE GENERAL MEDICAL EXAMINATION AT A HEALTH CARE FACILITY: Primary | ICD-10-CM

## 2023-03-16 DIAGNOSIS — Z13.0 SCREENING FOR IRON DEFICIENCY ANEMIA: ICD-10-CM

## 2023-03-16 DIAGNOSIS — Z13.29 SCREENING FOR THYROID DISORDER: ICD-10-CM

## 2023-03-16 DIAGNOSIS — Z12.31 VISIT FOR SCREENING MAMMOGRAM: ICD-10-CM

## 2023-03-16 DIAGNOSIS — F41.1 GAD (GENERALIZED ANXIETY DISORDER): ICD-10-CM

## 2023-03-16 DIAGNOSIS — Z13.220 LIPID SCREENING: ICD-10-CM

## 2023-03-16 LAB
ALBUMIN SERPL BCG-MCNC: 4.3 G/DL (ref 3.5–5.2)
ALP SERPL-CCNC: 60 U/L (ref 35–104)
ALT SERPL W P-5'-P-CCNC: 25 U/L (ref 10–35)
ANION GAP SERPL CALCULATED.3IONS-SCNC: 12 MMOL/L (ref 7–15)
AST SERPL W P-5'-P-CCNC: 27 U/L (ref 10–35)
BILIRUB SERPL-MCNC: 0.2 MG/DL
BUN SERPL-MCNC: 14.1 MG/DL (ref 6–20)
CALCIUM SERPL-MCNC: 9.4 MG/DL (ref 8.6–10)
CHLORIDE SERPL-SCNC: 104 MMOL/L (ref 98–107)
CHOLEST SERPL-MCNC: 188 MG/DL
CREAT SERPL-MCNC: 0.77 MG/DL (ref 0.51–0.95)
DEPRECATED HCO3 PLAS-SCNC: 24 MMOL/L (ref 22–29)
ERYTHROCYTE [DISTWIDTH] IN BLOOD BY AUTOMATED COUNT: 12 % (ref 10–15)
GFR SERPL CREATININE-BSD FRML MDRD: >90 ML/MIN/1.73M2
GLUCOSE SERPL-MCNC: 85 MG/DL (ref 70–99)
HCT VFR BLD AUTO: 39.3 % (ref 35–47)
HDLC SERPL-MCNC: 80 MG/DL
HGB BLD-MCNC: 13.3 G/DL (ref 11.7–15.7)
LDLC SERPL CALC-MCNC: 88 MG/DL
MCH RBC QN AUTO: 31.6 PG (ref 26.5–33)
MCHC RBC AUTO-ENTMCNC: 33.8 G/DL (ref 31.5–36.5)
MCV RBC AUTO: 93 FL (ref 78–100)
NONHDLC SERPL-MCNC: 108 MG/DL
PLATELET # BLD AUTO: 313 10E3/UL (ref 150–450)
POTASSIUM SERPL-SCNC: 4.1 MMOL/L (ref 3.4–5.3)
PROT SERPL-MCNC: 7.3 G/DL (ref 6.4–8.3)
RBC # BLD AUTO: 4.21 10E6/UL (ref 3.8–5.2)
SODIUM SERPL-SCNC: 140 MMOL/L (ref 136–145)
TRIGL SERPL-MCNC: 100 MG/DL
TSH SERPL DL<=0.005 MIU/L-ACNC: 1.49 UIU/ML (ref 0.3–4.2)
WBC # BLD AUTO: 7.9 10E3/UL (ref 4–11)

## 2023-03-16 PROCEDURE — 80061 LIPID PANEL: CPT | Performed by: NURSE PRACTITIONER

## 2023-03-16 PROCEDURE — 80053 COMPREHEN METABOLIC PANEL: CPT | Performed by: NURSE PRACTITIONER

## 2023-03-16 PROCEDURE — 99396 PREV VISIT EST AGE 40-64: CPT | Performed by: NURSE PRACTITIONER

## 2023-03-16 PROCEDURE — 85027 COMPLETE CBC AUTOMATED: CPT | Performed by: NURSE PRACTITIONER

## 2023-03-16 PROCEDURE — 84443 ASSAY THYROID STIM HORMONE: CPT | Performed by: NURSE PRACTITIONER

## 2023-03-16 PROCEDURE — 36415 COLL VENOUS BLD VENIPUNCTURE: CPT | Performed by: NURSE PRACTITIONER

## 2023-03-16 RX ORDER — ESCITALOPRAM OXALATE 10 MG/1
10 TABLET ORAL DAILY
Qty: 90 TABLET | Refills: 3 | Status: SHIPPED | OUTPATIENT
Start: 2023-03-16 | End: 2024-03-19

## 2023-03-16 RX ORDER — ESCITALOPRAM OXALATE 10 MG/1
10 TABLET ORAL DAILY
COMMUNITY
End: 2023-03-16

## 2023-03-16 ASSESSMENT — PAIN SCALES - GENERAL: PAINLEVEL: NO PAIN (0)

## 2023-03-16 ASSESSMENT — ENCOUNTER SYMPTOMS
WEAKNESS: 0
CONSTIPATION: 0
EYE PAIN: 0
FEVER: 0
HEADACHES: 0
NAUSEA: 0
CHILLS: 0
FREQUENCY: 0
ABDOMINAL PAIN: 0
SORE THROAT: 0
MYALGIAS: 0
HEMATOCHEZIA: 0
NERVOUS/ANXIOUS: 0
HEMATURIA: 0
JOINT SWELLING: 0
DIARRHEA: 0
DYSURIA: 0
ARTHRALGIAS: 0
PARESTHESIAS: 0
PALPITATIONS: 0
BREAST MASS: 0
DIZZINESS: 0
SHORTNESS OF BREATH: 0
HEARTBURN: 0
COUGH: 0

## 2023-03-16 NOTE — PROGRESS NOTES
SUBJECTIVE:   CC: Chela is an 48 year old who presents for preventive health visit.   Patient has been advised of split billing requirements and indicates understanding: Yes  Healthy Habits:     Getting at least 3 servings of Calcium per day:  Yes    Bi-annual eye exam:  Yes    Dental care twice a year:  Yes    Sleep apnea or symptoms of sleep apnea:  None    Diet:  Regular (no restrictions)    Frequency of exercise:  4-5 days/week    Duration of exercise:  30-45 minutes    Taking medications regularly:  Yes    Medication side effects:  None    PHQ-2 Total Score: 0    Additional concerns today:  No              Today's PHQ-2 Score:   PHQ-2 ( 1999 [employer]) 3/16/2023   Q1: Little interest or pleasure in doing things 0   Q2: Feeling down, depressed or hopeless 0   PHQ-2 Score 0   PHQ-2 Total Score (12-17 Years)- Positive if 3 or more points; Administer PHQ-A if positive -   Q1: Little interest or pleasure in doing things Not at all   Q2: Feeling down, depressed or hopeless Not at all   PHQ-2 Score 0       Have you ever done Advance Care Planning? (For example, a Health Directive, POLST, or a discussion with a medical provider or your loved ones about your wishes): No, advance care planning information given to patient to review.  Patient declined advance care planning discussion at this time.    Social History     Tobacco Use     Smoking status: Never     Passive exposure: Never     Smokeless tobacco: Never   Substance Use Topics     Alcohol use: Yes     Alcohol/week: 0.8 standard drinks     Comment: 2 drinks 2 times per week         Alcohol Use 3/16/2023   Prescreen: >3 drinks/day or >7 drinks/week? No       Reviewed orders with patient.  Reviewed health maintenance and updated orders accordingly - Yes  Lab work is in process    Breast Cancer Screening:  Any new diagnosis of family breast, ovarian, or bowel cancer? No    FHS-7: No flowsheet data found.    Mammogram Screening: Recommended annual  "mammography  Pertinent mammograms are reviewed under the imaging tab.    History of abnormal Pap smear: NO - age 30-65 PAP every 5 years with negative HPV co-testing recommended  PAP / HPV Latest Ref Rng & Units 2/2/2018 1/5/2015 11/11/2011   PAP (Historical) - NIL ASC-US(A) NIL   HPV16 NEG:Negative Negative - -   HPV18 NEG:Negative Negative - -   HRHPV NEG:Negative Negative - -     Reviewed and updated as needed this visit by clinical staff   Tobacco  Allergies               Reviewed and updated as needed this visit by Provider                     Review of Systems   Constitutional: Negative for chills and fever.   HENT: Negative for congestion, ear pain, hearing loss and sore throat.    Eyes: Negative for pain and visual disturbance.   Respiratory: Negative for cough and shortness of breath.    Cardiovascular: Negative for chest pain, palpitations and peripheral edema.   Gastrointestinal: Negative for abdominal pain, constipation, diarrhea, heartburn, hematochezia and nausea.   Breasts:  Negative for tenderness, breast mass and discharge.   Genitourinary: Negative for dysuria, frequency, genital sores, hematuria, pelvic pain, urgency, vaginal bleeding and vaginal discharge.   Musculoskeletal: Negative for arthralgias, joint swelling and myalgias.   Skin: Negative for rash.   Neurological: Negative for dizziness, weakness, headaches and paresthesias.   Psychiatric/Behavioral: Negative for mood changes. The patient is not nervous/anxious.           OBJECTIVE:   /70 (BP Location: Right arm, Patient Position: Sitting, Cuff Size: Adult Regular)   Pulse 59   Temp 99  F (37.2  C) (Temporal)   Resp 16   Ht 1.562 m (5' 1.5\")   Wt 61.7 kg (136 lb)   SpO2 99%   BMI 25.28 kg/m    Physical Exam  GENERAL: healthy, alert and no distress  EYES: Eyes grossly normal to inspection, PERRL and conjunctivae and sclerae normal  HENT: ear canals and TM's normal, nose and mouth without ulcers or lesions  NECK: no " adenopathy, no asymmetry, masses, or scars and thyroid normal to palpation  RESP: lungs clear to auscultation - no rales, rhonchi or wheezes  BREAST: normal without masses, tenderness or nipple discharge and no palpable axillary masses or adenopathy  CV: regular rate and rhythm, normal S1 S2, no S3 or S4, no murmur, click or rub, no peripheral edema and peripheral pulses strong  ABDOMEN: soft, nontender, no hepatosplenomegaly, no masses and bowel sounds normal  MS: no gross musculoskeletal defects noted, no edema  SKIN: no suspicious lesions or rashes  NEURO: Normal strength and tone, mentation intact and speech normal  PSYCH: mentation appears normal, affect normal/bright        ASSESSMENT/PLAN:   (Z00.00) Routine general medical examination at a health care facility  (primary encounter diagnosis)  Comment: Reviewed medical/social/family history and health maintenance  Plan:     (F41.1) ALMAS (generalized anxiety disorder)  Comment: Well controlled on lexapro.  Refills provided  Plan: escitalopram (LEXAPRO) 10 MG tablet            (Z12.31) Visit for screening mammogram  Comment:   Plan: CANCELED: MA SCREENING DIGITAL BILAT - Future          (s+30)            (Z12.83) Skin cancer screening  Comment:   Plan: Adult Dermatology Referral            (Z13.220) Lipid screening  Comment:   Plan: Lipid panel reflex to direct LDL Non-fasting            (Z13.29) Screening for thyroid disorder  Comment:   Plan: TSH with free T4 reflex            (Z13.1) Screening for diabetes mellitus  Comment:   Plan: Comprehensive metabolic panel (BMP + Alb, Alk         Phos, ALT, AST, Total. Bili, TP)            (Z13.0) Screening for iron deficiency anemia  Comment:   Plan: CBC with platelets              Patient has been advised of split billing requirements and indicates understanding: Yes      COUNSELING:  Reviewed preventive health counseling, as reflected in patient instructions        She reports that she has never smoked. She has never  been exposed to tobacco smoke. She has never used smokeless tobacco.          MANUEL Farias CNP  [facility]

## 2023-03-17 ENCOUNTER — DOCUMENTATION ONLY (OUTPATIENT)
Dept: GASTROENTEROLOGY | Facility: CLINIC | Age: 48
End: 2023-03-17
Payer: COMMERCIAL

## 2023-03-17 NOTE — PROGRESS NOTES
Colorectal Cancer Screening Results  External result received from: Rene  Date of Procedure: 11/12/22     Health Maintenance updated based on provider recommendations/ASGE Guidelines. 10 years for a normal colonoscopy.     Kathryn Natarajan RN on 3/17/2023 at 8:25 AM

## 2023-03-20 ENCOUNTER — MYC MEDICAL ADVICE (OUTPATIENT)
Dept: FAMILY MEDICINE | Facility: CLINIC | Age: 48
End: 2023-03-20
Payer: COMMERCIAL

## 2023-03-28 ENCOUNTER — VIRTUAL VISIT (OUTPATIENT)
Dept: FAMILY MEDICINE | Facility: CLINIC | Age: 48
End: 2023-03-28
Payer: COMMERCIAL

## 2023-03-28 DIAGNOSIS — S00.81XA CAT SCRATCH OF FACE, INITIAL ENCOUNTER: Primary | ICD-10-CM
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
## 2023-03-28 PROCEDURE — 99213 OFFICE O/P EST LOW 20 MIN: CPT | Mod: VID | Performed by: NURSE PRACTITIONER

## 2023-03-28 RX ORDER — CEPHALEXIN 500 MG/1
500 CAPSULE ORAL 2 TIMES DAILY
Qty: 10 CAPSULE | Refills: 0 | Status: SHIPPED | OUTPATIENT
Start: 2023-03-28 | End: 2024-03-19

## 2023-03-28 NOTE — PROGRESS NOTES
"Chela is a 48 year old who is being evaluated via a billable video visit.      How would you like to obtain your AVS? MyChart  If the video visit is dropped, the invitation should be resent by: Text to cell phone: [phone]  Will anyone else be joining your video visit? No          Assessment & Plan     Cat scratch of face, initial encounter  Given the proximity to her eye, we should treat with antibiotics for infection prophylaxis.  No recent antibiotic use.  Will send in Keflex x 5 days.    - cephALEXin (KEFLEX) 500 MG capsule; Take 1 capsule (500 mg) by mouth 2 times daily    Prescription drug management         BMI:   Estimated body mass index is 25.28 kg/m  as calculated from the following:    Height as of 3/16/23: 1.562 m (5' 1.5\").    Weight as of 3/16/23: 61.7 kg (136 lb).           MANUEL Farias [facility]   Chela is a 48 year old, presenting for the following health issues:  No chief complaint on file.  No flowsheet data found.  History of Present Illness       Reason for visit:  Cat scratch near eye  Symptom onset:  1-3 days ago  Symptoms include:  Bruising, slight swelling, soreness  Symptom intensity:  Mild  Symptom progression:  Staying the same  Had these symptoms before:  No    She eats 4 or more servings of fruits and vegetables daily.She consumes 0 sweetened beverage(s) daily.She exercises with enough effort to increase her heart rate 20 to 29 minutes per day.  She exercises with enough effort to increase her heart rate 4 days per week.   She is taking medications regularly.       Cat scratched in the corner of her eye, bleed quite a bit and she developed bruising.        Review of Systems   Constitutional, HEENT, cardiovascular, pulmonary, gi and gu systems are negative, except as otherwise noted.      Objective           Vitals:  No vitals were obtained today due to virtual visit.    Physical Exam   GENERAL: Healthy, alert and no " distress  EYES: Eyes grossly normal to inspection.  No discharge or erythema, or obvious scleral/conjunctival abnormalities.  RESP: No audible wheeze, cough, or visible cyanosis.  No visible retractions or increased work of breathing.    SKIN: Visible skin clear. No significant rash, abnormal pigmentation or lesions.  NEURO: Cranial nerves grossly intact.  Mentation and speech appropriate for age.  PSYCH: Mentation appears normal, affect normal/bright, judgement and insight intact, normal speech and appearance well-groomed.                Video-Visit Details    Type of service:  Video Visit     Originating Location (pt. Location): Home    Distant Location (provider location):  Off-site  Platform used for Video Visit: Remi

## 2023-05-15 NOTE — NURSING NOTE
"Chief Complaint   Patient presents with     Physical       Initial /68  Pulse 55  Temp 97.1  F (36.2  C) (Oral)  Resp 16  Ht 5' 1.25\" (1.556 m)  Wt 120 lb (54.4 kg)  LMP 04/10/2017 (Approximate)  SpO2 100%  BMI 22.49 kg/m2 Estimated body mass index is 22.49 kg/(m^2) as calculated from the following:    Height as of this encounter: 5' 1.25\" (1.556 m).    Weight as of this encounter: 120 lb (54.4 kg).  Medication Reconciliation: complete     Roula Steele MA      "
no

## 2023-06-05 ENCOUNTER — OFFICE VISIT (OUTPATIENT)
Dept: ORTHOPEDICS | Facility: CLINIC | Age: 48
End: 2023-06-05
Payer: COMMERCIAL

## 2023-06-05 VITALS — HEIGHT: 62 IN | BODY MASS INDEX: 23.92 KG/M2 | WEIGHT: 130 LBS

## 2023-06-05 DIAGNOSIS — M54.12 ACUTE CERVICAL RADICULOPATHY: Primary | ICD-10-CM

## 2023-06-05 PROCEDURE — 99203 OFFICE O/P NEW LOW 30 MIN: CPT | Performed by: FAMILY MEDICINE

## 2023-06-05 NOTE — LETTER
6/5/2023         RE: Chela Villatoro  [address] [id number]        Dear Colleague,    Thank you for referring your patient, Chela Villatoro, to the [facility] [place]. Please see a copy of my visit note below.    ASSESSMENT & PLAN  Patient Instructions     1. Acute cervical radiculopathy      -Patient has numbness down the left arm due to cervical nerve root  -Patient will start formal physical therapy and home exercise program  -Patient states that she often has symptoms at nighttime when laying on her left side.  Patient sleeps with a very thick pillow which can put excess flexion on the neck causing compression and irritation of the nerve roots.  Patient was shown a more ergonomic pillow that she can purchase on [employer] that allows her to sleep on her back or side.  -Patient will follow-up if symptoms do not improve or if she develops pain and/or weakness.  -Call direct clinic number [[phone]] at any time with questions or concerns.    Albert Yeo MD [facility] Orthopedics and [facility] Medicine  [facility]          -----    SUBJECTIVE  Chela Villatoro is a/an 48 year old Right handed female who is seen as a self referral for evaluation of left shoulder numbness. The patient is seen by themselves.    Onset: 7 days ago. Reports insidious onset without acute precipitating event.  Location of Pain: left lateral shoulder with intermittent radiating symptoms down left arm to hand  Rating of Pain at worst: 0/10  Rating of Pain Currently: 0/10  Worsened by: symptoms occur randomly  Better with: massage provided relief for ~ 1 day  Treatments tried: rest/activity avoidance, massage  Associated symptoms: numbness and burning  Orthopedic history: NO  Relevant surgical history: NO  Social history: social history: works -     Past Medical History:   Diagnosis Date     ASCUS favor benign 1/2015    neg HPV Plan cotest in 3 yrs.     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   Other Topics Concern     Parent/sibling w/ CABG, MI or angioplasty before 65F 55M? No   Social History Narrative    Caffeine intake/servings daily - 1    Calcium intake/servings daily - 1-2    Exercise 3 times weekly - describe walking, running    Sunscreen used - Yes    Seatbelts used - Yes    Guns stored in the home - No    Self Breast Exam - occ    Pap test up to date -  Last pap 5/13/2010    Eye exam up to date -  Yes    Dental exam up to date -  Yes    DEXA scan up to date -  Not Applicable    Flex Sig/Colonoscopy up to date -  Not Applicable    Mammography up to date -  Not Applicable    Immunizations reviewed and up to date - tdap 2008. Flu shot already    Abuse: Current or Past (Physical, Sexual or Emotional) - No    Do you feel safe in your environment - Yes    Do you cope well with stress - Yes    Do you suffer from insomnia - No    Last updated by: Shruti Eason MA.                                 Patient's past medical, surgical, social, and family histories were reviewed today and no changes are noted.    REVIEW OF SYSTEMS:  10 point ROS is negative other than symptoms noted above in HPI, Past Medical History or as stated below  Constitutional: NEGATIVE for fever, chills, change in weight  Skin: NEGATIVE for worrisome rashes, moles or lesions  GI/: NEGATIVE for bowel or bladder changes  Neuro:  "NEGATIVE for weakness, dizziness or paresthesias    OBJECTIVE:  Ht 1.562 m (5' 1.5\")   Wt 59 kg (130 lb)   BMI 24.17 kg/m     General: healthy, alert and in no distress  HEENT: no scleral icterus or conjunctival erythema  Skin: no suspicious lesions or rash. No jaundice.  CV: regular rhythm by palpation  Resp: normal respiratory effort without conversational dyspnea   Psych: normal mood and affect  Gait: normal steady gait with appropriate coordination and balance  Neuro: normal light touch sensory exam of the bilateral upper extremities.    MSK:    CERVICAL SPINE  Inspection:    normal cervical lordosis present, rounded shoulders, forward head posture  Palpation:  landmarks and nontender.  Range of Motion:     Flexion within normal limits    Extension limited by tightness    Right side bend within normal limits    Left side bend within normal limits    Right rotation within normal limits    Left rotation within normal limits  Strength:    Full strength throughout all neck muscles  Special Tests:    Positive: Spurling's (bilateral)    Negative: Erwin's (bilateral)    Independent visualization of the below image:  No results found for this or any previous visit (from the past 24 hour(s)).          Albert Yeo MD [facility]        Again, thank you for allowing me to participate in the care of your patient.        Sincerely,        Albert Yeo, MD    "

## 2023-06-05 NOTE — PATIENT INSTRUCTIONS
1. Acute cervical radiculopathy      -Patient has numbness down the left arm due to cervical nerve root  -Patient will start formal physical therapy and home exercise program  -Patient states that she often has symptoms at nighttime when laying on her left side.  Patient sleeps with a very thick pillow which can put excess flexion on the neck causing compression and irritation of the nerve roots.  Patient was shown a more ergonomic pillow that she can purchase on [employer] that allows her to sleep on her back or side.  -Patient will follow-up if symptoms do not improve or if she develops pain and/or weakness.  -Call direct clinic number [[phone]] at any time with questions or concerns.    Albert Yeo MD [facility]  [facility] [place]

## 2023-06-05 NOTE — PROGRESS NOTES
ASSESSMENT & PLAN  Patient Instructions     1. Acute cervical radiculopathy      -Patient has numbness down the left arm due to cervical nerve root  -Patient will start formal physical therapy and home exercise program  -Patient states that she often has symptoms at nighttime when laying on her left side.  Patient sleeps with a very thick pillow which can put excess flexion on the neck causing compression and irritation of the nerve roots.  Patient was shown a more ergonomic pillow that she can purchase on [employer] that allows her to sleep on her back or side.  -Patient will follow-up if symptoms do not improve or if she develops pain and/or weakness.  -Call direct clinic number [[phone]] at any time with questions or concerns.    Albert Yeo MD [facility]  [facility]          -----    SUBJECTIVE  Chela Villatoro is a/an 48 year old Right handed female who is seen as a self referral for evaluation of left shoulder numbness. The patient is seen by themselves.    Onset: 7 days ago. Reports insidious onset without acute precipitating event.  Location of Pain: left lateral shoulder with intermittent radiating symptoms down left arm to hand  Rating of Pain at worst: 0/10  Rating of Pain Currently: 0/10  Worsened by: symptoms occur randomly  Better with: massage provided relief for ~ 1 day  Treatments tried: rest/activity avoidance, massage  Associated symptoms: numbness and burning  Orthopedic history: NO  Relevant surgical history: NO  Social history: social history: works -     Past Medical History:   Diagnosis Date     ASCUS favor benign 1/2015    neg HPV Plan cotest in 3 yrs.     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   Other Topics Concern     Parent/sibling w/ CABG, MI or angioplasty before 65F 55M? No   Social History Narrative    Caffeine intake/servings daily - 1    Calcium intake/servings daily - 1-2    Exercise 3 times weekly - describe walking, running    Sunscreen used - Yes    Seatbelts used - Yes    Guns stored in the home - No    Self Breast Exam - occ    Pap test up to date -  Last pap 5/13/2010    Eye exam up to date -  Yes    Dental exam up to date -  Yes    DEXA scan up to date -  Not Applicable    Flex Sig/Colonoscopy up to date -  Not Applicable    Mammography up to date -  Not Applicable    Immunizations reviewed and up to date - tdap 2008. Flu shot already    Abuse: Current or Past (Physical, Sexual or Emotional) - No    Do you feel safe in your environment - Yes    Do you cope well with stress - Yes    Do you suffer from insomnia - No    Last updated by: Shruti Eason MA.                                 Patient's past medical, surgical, social, and family histories were reviewed today and no changes are noted.    REVIEW OF SYSTEMS:  10 point ROS is negative other than symptoms noted above in HPI, Past Medical History or as stated below  Constitutional: NEGATIVE for fever, chills, change in weight  Skin: NEGATIVE for worrisome rashes, moles or lesions  GI/: NEGATIVE for bowel or bladder changes  Neuro: NEGATIVE for weakness, dizziness or paresthesias    OBJECTIVE:  Ht 1.562 m (5' 1.5\")   Wt 59 kg (130 lb)   BMI 24.17 kg/m     General: healthy, alert and in no distress  HEENT: no scleral icterus or conjunctival erythema  Skin: no suspicious lesions or rash. No " jaundice.  CV: regular rhythm by palpation  Resp: normal respiratory effort without conversational dyspnea   Psych: normal mood and affect  Gait: normal steady gait with appropriate coordination and balance  Neuro: normal light touch sensory exam of the bilateral upper extremities.    MSK:    CERVICAL SPINE  Inspection:    normal cervical lordosis present, rounded shoulders, forward head posture  Palpation:  landmarks and nontender.  Range of Motion:     Flexion within normal limits    Extension limited by tightness    Right side bend within normal limits    Left side bend within normal limits    Right rotation within normal limits    Left rotation within normal limits  Strength:    Full strength throughout all neck muscles  Special Tests:    Positive: Spurling's (bilateral)    Negative: Erwin's (bilateral)    Independent visualization of the below image:  No results found for this or any previous visit (from the past 24 hour(s)).          Albert Yeo MD [facility] and Orthopedic Care

## 2023-06-13 ENCOUNTER — THERAPY VISIT (OUTPATIENT)
Dept: PHYSICAL THERAPY | Facility: REHABILITATION | Age: 48
End: 2023-06-13
Attending: FAMILY MEDICINE
Payer: COMMERCIAL

## 2023-06-13 DIAGNOSIS — M54.12 ACUTE CERVICAL RADICULOPATHY: Primary | ICD-10-CM

## 2023-06-13 PROCEDURE — 97140 MANUAL THERAPY 1/> REGIONS: CPT | Mod: GP | Performed by: PHYSICAL THERAPIST

## 2023-06-13 PROCEDURE — 97161 PT EVAL LOW COMPLEX 20 MIN: CPT | Mod: GP | Performed by: PHYSICAL THERAPIST

## 2023-06-13 PROCEDURE — 97110 THERAPEUTIC EXERCISES: CPT | Mod: GP | Performed by: PHYSICAL THERAPIST

## 2023-06-13 NOTE — PROGRESS NOTES
PHYSICAL THERAPY EVALUATION  Type of Visit: Evaluation    See electronic medical record for Abuse and Falls Screening details.    Subjective      Presenting condition or subjective complaint: numbness and tingling in shoulder and arm. It is intermittent and has been more frequent. Has been going on for about 2 weeks  Pain started about 2 weeks ago, without injury, no pain or limited ROM. Having pain in the upper left shoulder like a burning sensation, after 1-2 days pain would travel down to the hands. Happens randomly. Does feel that it is getting worse. Still able to work out. MD recommended a different pillow that she is trying. Reports in past has had episodes of neck pain without ability to turn the head, but this is different. Pain Described as numbness in LUE that is intermittent from the shoulder down to the hand, can be thumb and 4th/5th digits. Worse with laying down seems to bother it at times, hard to identify anything specific. Better with possibly massage. She reports walking/running/going to [employer] for exercise.  Date of onset: (P) 05/30/23    Relevant medical history: Migraines or headaches   Dates & types of surgery:  NA    Prior diagnostic imaging/testing results:     None  Prior therapy history for the same diagnosis, illness or injury: No      Prior Level of Function   Transfers:   Ambulation:   ADL:   IADL:     Living Environment  Social support: With a significant other or spouse   Type of home: House   Stairs to enter the home: Yes 5 Is there a railing: Yes   Ramp: No   Stairs inside the home: Yes 12 Is there a railing: Yes   Help at home: None  Equipment owned:       Employment: Yes [employer]iative   Hobbies/Interests: exercising, walking dogs,    Patient goals for therapy:    Decrease pain    Walks/runs/goes to [employer].     Pain assessment: numbness vs pain     Objective     Involved side: Left  Posture Observation:      Normal    Cervical ROM:    Date: 6/13/2023      *Indicate scale AROM AROM AROM   Cervical Flexion 40 deg     Cervical Extension 38 deg      Right Left Right Left Right Left   Cervical Sidebending 20 deg 20 deg pain       Cervical Rotation WNL WNL       Thoracic Rotation           Strength     Date: 6/13/2023     Cervical Myotomes/5 Right Left Right Left Right Left   Cervical Flexion (C1-2)         Cervical Sidebending (C3)         Shoulder Elevation (C4) 5 5       Shoulder Abduction (C5) 5 5       Elbow Flexion (C6) 5 5       Elbow Extension (C7) 5 5       Wrist Flexion (C7) 5 5       Wrist Extension (C6) 5 5       Thumb abduction (C8) 5 5       Finger Abduction (T1) 5 5         Sensation   Intact- occasional numbness   Reflex Testing  Cervical Dermatomes Right Left UE Reflexes Right Left   Back of the Head (C2)   Biceps (C5-6)     Supraclavicular Fossa (C3)   Brachioradialis (C5-6)     AC Joint (C4)   Triceps (C7-8)     Lateral Biceps (C5)   Jaci s test     Palmar Thumb (C6)   LE Reflexes     Palmar 3rd Finger (C7)   Patellar (L3-4)     Palmar 5th Finger (C8)   Achilles (S1-2)     Ulnar Forearm (T1)   Babinski Response       Flexibility: Decreased pec and UT mobility    Palpation:tenderness to upper trapezius, scalene, pec minor, subscap, and infraspinatus, all with LUE referral pain/numbness.     Passive Mobility-Joint Integrity:  Not tested    Cervical Special Tests     Cervical Special Tests Right Left UE Nerve Mobility Right Left   Cervical compression + + Median nerve - +   Cervical distraction + + Ulnar nerve - +   Spurling s test + + Radial nerve - +   Shoulder abduction sign - - Thoracic outlet     Deep neck flexor endurance test   Titus        Tanvi s     Sharper-Mendel   Cervical rotation lateral flexion     Alar ligament test   Other: 1st rib mobility  Dec with pain in LUE   Transverse Ligament Test   Other:       Cervical CPR Tests    Spurling's A +   Relief with cervical distraction +   ULTTa +   Involved side cervical rotation <60 deg -      Diagnostic values of results (95% Confidence Intervals) are as follows:   Number of Positive Criteria  Sensitivity  Specificity  Pos LR  Neg LR*    Two  0.39 (0.16-0.61)  0.56 (0.43-0.68)  0.88 (1.5-2.5)  1.09    Three  0.39 (0.16-0.61)  0.94 (0.88-1.0)  6.1 (2.0-18.6)  0.65    Four  0.24 (0.05-0.43)  0.99 (0.97-1.0)  30.3 (1.7-538.2)  0.77    (Table adapted from Claire et al 2003. Pos LR = positive likelihood ratio. Neg LR = negative likelihood ratio. *Calculated from Sn and Sp values provided in Claire et al 2003.)       Assessment & Plan   CLINICAL IMPRESSIONS   Medical Diagnosis: Acute cervical radiculopathy    Treatment Diagnosis: (P) UE radicular symptoms, neural tension on left UE, myofascial pain   Impression/Assessment: Patient is a 48 year old female with left UE numbness complaints over the last 2 weeks.  The following significant findings have been identified: Pain, Decreased ROM/flexibility and + UE neural tension testing and muscular tightness with referral pain patterns, as well as testing positive for cervical radiculitis. These impairments interfere with their ability to sleep and do daily tasks, though discomfort is random, as compared to previous level of function.     Clinical Decision Making (Complexity):   Clinical Presentation: Stable/Uncomplicated  Clinical Presentation Rationale: based on medical and personal factors listed in PT evaluation  Clinical Decision Making (Complexity): Low complexity    PLAN OF CARE  Treatment Interventions:  Modalities: Mechanical Traction  Interventions: Manual Therapy, Neuromuscular Re-education, Therapeutic Activity, Therapeutic Exercise, Self-Care/Home Management    Long Term Goals     PT Goal 1  Goal Description: (P) Patient will report being able to lay down and sleep without increased pain in 8 weeks:  PT Goal 2  Goal Description: (P) Patient will report 75% reduction in symptoms in 8 weeks:  PT Goal 3  Goal Description: (P) Patient will demonstrate  understanding of HEP for self managment of pain in 8 weeks:      Frequency of Treatment: 1X/week  Duration of Treatment: up to 6 visits over 12 weeks:    Recommended Referrals to Other Professionals: NA  Education Assessment:        Risks and benefits of evaluation/treatment have been explained.   Patient/Family/caregiver agrees with Plan of Care.     Evaluation Time:     DEWAYNE Nolasco Low Complexity Minutes (80685): (P) 17      Signing Clinician: Stevenson Overton PT

## 2023-06-19 ENCOUNTER — THERAPY VISIT (OUTPATIENT)
Dept: PHYSICAL THERAPY | Facility: CLINIC | Age: 48
End: 2023-06-19
Payer: COMMERCIAL

## 2023-06-19 DIAGNOSIS — M54.12 ACUTE CERVICAL RADICULOPATHY: Primary | ICD-10-CM

## 2023-06-19 PROCEDURE — 97110 THERAPEUTIC EXERCISES: CPT | Mod: GP | Performed by: PHYSICAL THERAPIST

## 2023-06-19 PROCEDURE — 97012 MECHANICAL TRACTION THERAPY: CPT | Mod: GP | Performed by: PHYSICAL THERAPIST

## 2023-08-14 ENCOUNTER — ANCILLARY PROCEDURE (OUTPATIENT)
Dept: MAMMOGRAPHY | Facility: CLINIC | Age: 48
End: 2023-08-14
Attending: NURSE PRACTITIONER
Payer: COMMERCIAL

## 2023-08-14 DIAGNOSIS — Z12.31 VISIT FOR SCREENING MAMMOGRAM: ICD-10-CM

## 2023-08-14 PROCEDURE — 77063 BREAST TOMOSYNTHESIS BI: CPT | Mod: TC | Performed by: RADIOLOGY

## 2023-08-14 PROCEDURE — 77067 SCR MAMMO BI INCL CAD: CPT | Mod: TC | Performed by: RADIOLOGY

## 2023-09-12 NOTE — PROGRESS NOTES
DISCHARGE  Reason for Discharge: Patient chooses to discontinue therapy.  Patient has failed to schedule further appointments.    Equipment Issued: NA    Discharge Plan: Patient to continue home program.    Referring Provider:  Albert Yeo    
ADMIT

## 2023-10-09 ENCOUNTER — MYC MEDICAL ADVICE (OUTPATIENT)
Dept: FAMILY MEDICINE | Facility: CLINIC | Age: 48
End: 2023-10-09
Payer: COMMERCIAL

## 2023-10-10 NOTE — TELEPHONE ENCOUNTER
Writer reconciled immunizations and responded to patient via [employer].   Nancy Katz RN  [facility]

## 2024-02-12 ENCOUNTER — MYC MEDICAL ADVICE (OUTPATIENT)
Dept: FAMILY MEDICINE | Facility: CLINIC | Age: 49
End: 2024-02-12
Payer: COMMERCIAL

## 2024-02-12 DIAGNOSIS — M25.521 RIGHT ELBOW PAIN: Primary | ICD-10-CM

## 2024-02-12 ASSESSMENT — ACTIVITIES OF DAILY LIVING (ADL)
USING_TOOLS_OR_APPLIANCES: MODERATE DIFFICULTY
OPENING_DOORS: A LITTLE BIT OF DIFFICULTY
SLEEPING: NO DIFFICULTY
TYING_OR_LACING_SHOES: NO DIFFICULTY
PLEASE_INDICATE_YOR_PRIMARY_REASON_FOR_REFERRAL_TO_THERAPY:: ELBOW
PREPARING_FOOD: NO DIFFICULTY
DOING_UP_BUTTONS: NO DIFFICULTY
LAUNDERING_CLOTHES: A LITTLE BIT OF DIFFICULTY
YOUR_USUAL_HOBBIES,_RECREATIONAL_OR_SPORTING_ACTIVITIES: QUITE A BIT OF DIFFICULTY
CARRYING_A_SMALL_SUITCASE_WITH_YOUR_AFFECTED_LIMB: EXTREME DIFFICULTY
LIFTING_A_BAG_OF_GROCERIES_TO_WAIST_LEVEL: EXTREME DIFFICULTY
PLACING_AN_OBJECT_ONTO,_OR_REMOVING_IT_FROM_AN_OVERHEAD_SHELF: MODERATE DIFFICULTY
OPENING_A_JAR: QUITE A BIT OF DIFFICULTY
UEFI_TOTAL_SCORE: 50
ANY_OF_YOUR_USUAL_WORK,_HOUSEWORK_OR_SCHOOL_ACTIVITIES: QUITE A BIT OF DIFFICULTY
CLEANING: QUITE A BIT OF DIFFICULTY
PUSHING_UP_ON_YOUR_HANDS: A LITTLE BIT OF DIFFICULTY
UEFI_TOTAL_SCORE/80: .63
VACUUMING,_SWEEPING,_OR_RAKING: MODERATE DIFFICULTY
THROWING_A_BALL: NO DIFFICULTY
WASHING_YOUR_HAIR_OR_SCALP: NO DIFFICULTY
DRESS: NO DIFFICULTY
DRIVING: NO DIFFICULTY

## 2024-02-15 ENCOUNTER — THERAPY VISIT (OUTPATIENT)
Dept: PHYSICAL THERAPY | Facility: CLINIC | Age: 49
End: 2024-02-15
Payer: COMMERCIAL

## 2024-02-15 DIAGNOSIS — M25.521 RIGHT ELBOW PAIN: ICD-10-CM

## 2024-02-15 PROCEDURE — 97161 PT EVAL LOW COMPLEX 20 MIN: CPT | Mod: GP | Performed by: PHYSICAL THERAPIST

## 2024-02-15 PROCEDURE — 97110 THERAPEUTIC EXERCISES: CPT | Mod: GP | Performed by: PHYSICAL THERAPIST

## 2024-02-15 PROCEDURE — 97530 THERAPEUTIC ACTIVITIES: CPT | Mod: GP | Performed by: PHYSICAL THERAPIST

## 2024-02-15 NOTE — PROGRESS NOTES
PHYSICAL THERAPY EVALUATION  Type of Visit: Evaluation    See electronic medical record for Abuse and Falls Screening details.    Subjective       Presenting condition or subjective complaint: worsening elbow pain  Date of onset: 12/15/23      Prior therapy history for the same diagnosis, illness or injury: No      Living Environment  Social support: With a significant other or spouse   Type of home: House   Stairs to enter the home: Yes 4 Is there a railing: Yes   Ramp: No   Stairs inside the home: Yes 12 Is there a railing: Yes   Help at home: None    Employment: Yes   Hobbies/Interests: exercise, cooking,    Patient goals for therapy: lift things,    Chela Villatoro is a 49 year old female with a right elbow condition. Mechanism of injury: Unknown cause. Where: (home, work, MVA, community, recreation/sport, unknown, other): NA. Location of symptoms: lateral elbow, proximal wrist extensors. Pain level on number scale: 0-8/10. Quality of pain: soreness. Associated symptoms: denies stiffness, weakness, numbness, tinging. Pain frequency (constant/intermittent): intermittent. Symptoms are exacerbated by: lifting, carrying, sleeping, cooking, cleaning. Symptoms are relieved by: avoiding painful position. Progression of symptoms since onset (same/better/worse): worse. Special tests (x-ray, MRI, CT scan, EMG, bone scan): None. Previous treatment: None. Improvement with previous treatment: NA. General health as reported by patient is good. Pertinent medical history includes:  see Epic. Medical allergies includes: see Epic. Surgical history includes: see Epic. Current medications include: see Epic. Occupation: . Patient is (working in normal job without restrictions/working in normal job with restrictions/working in an alternate job/not working due to present treatment problem): working in normal job. Primary job tasks: computer work. Barriers at home/work: None reported by patient. Red flags: None  reported by patient.     Objective   Right elbow AROM WNL  Weak right wrist EXT  Palpation tenderness right lateral epicondyle and proximal wrist extensors  Min loss grossly throughout cervical AROM  Right shoulder AROM WNL - pain during IR  Reduced shoulder, neck and elbow pain after performing repeated cervical RET EXT and self OP as well as repeated thoracic EXT    Assessment & Plan   CLINICAL IMPRESSIONS  Medical Diagnosis: right elbow pain    Treatment Diagnosis: right elbow pain   Impression/Assessment: Patient is a 49 year old female with right elbow complaints.  The following significant findings have been identified: Pain, Decreased ROM/flexibility, Decreased joint mobility, Decreased strength, Impaired muscle performance, Decreased activity tolerance, and Impaired posture. These impairments interfere with their ability to perform self care tasks, work tasks, recreational activities, household chores, and driving  as compared to previous level of function.     Clinical Decision Making (Complexity):  Clinical Presentation: Stable/Uncomplicated  Clinical Presentation Rationale: based on medical and personal factors listed in PT evaluation  Clinical Decision Making (Complexity): Low complexity    PLAN OF CARE  Treatment Interventions:  Interventions: Manual Therapy, Neuromuscular Re-education, Therapeutic Activity, Therapeutic Exercise, Self-Care/Home Management    Long Term Goals     PT Goal 1  Goal Description: Patient will have unrestricted reaching with 0/10 pain.  Rationale: to maximize safety and independence with performance of ADLs and functional tasks  Target Date: 05/09/24      Frequency of Treatment: 1x/week  Duration of Treatment: for 4 weeks tapering down to 2x/month for 8 weeks    Recommended Referrals to Other Professionals:  None  Education Assessment:   Learner/Method: Patient;No Barriers to Learning    Risks and benefits of evaluation/treatment have been explained.   Patient/Family/caregiver  agrees with Plan of Care.     Evaluation Time:     PT Gaurav, Low Complexity Minutes (65778): 15  Signing Clinician: Ar Duncan PT

## 2024-03-13 ENCOUNTER — THERAPY VISIT (OUTPATIENT)
Dept: PHYSICAL THERAPY | Facility: CLINIC | Age: 49
End: 2024-03-13
Payer: COMMERCIAL

## 2024-03-13 DIAGNOSIS — M25.521 RIGHT ELBOW PAIN: Primary | ICD-10-CM

## 2024-03-13 PROCEDURE — 97110 THERAPEUTIC EXERCISES: CPT | Mod: GP | Performed by: PHYSICAL THERAPIST

## 2024-03-13 PROCEDURE — 97530 THERAPEUTIC ACTIVITIES: CPT | Mod: GP | Performed by: PHYSICAL THERAPIST

## 2024-03-19 ENCOUNTER — OFFICE VISIT (OUTPATIENT)
Dept: FAMILY MEDICINE | Facility: CLINIC | Age: 49
End: 2024-03-19
Payer: COMMERCIAL

## 2024-03-19 VITALS
TEMPERATURE: 97.8 F | DIASTOLIC BLOOD PRESSURE: 72 MMHG | OXYGEN SATURATION: 99 % | SYSTOLIC BLOOD PRESSURE: 110 MMHG | HEART RATE: 63 BPM | WEIGHT: 136.9 LBS | HEIGHT: 61 IN | BODY MASS INDEX: 25.85 KG/M2 | RESPIRATION RATE: 16 BRPM

## 2024-03-19 DIAGNOSIS — Z13.29 SCREENING FOR THYROID DISORDER: ICD-10-CM

## 2024-03-19 DIAGNOSIS — Z00.00 ROUTINE GENERAL MEDICAL EXAMINATION AT A HEALTH CARE FACILITY: Primary | ICD-10-CM

## 2024-03-19 DIAGNOSIS — Z13.220 LIPID SCREENING: ICD-10-CM

## 2024-03-19 DIAGNOSIS — Z13.1 SCREENING FOR DIABETES MELLITUS: ICD-10-CM

## 2024-03-19 DIAGNOSIS — Z12.4 CERVICAL CANCER SCREENING: ICD-10-CM

## 2024-03-19 DIAGNOSIS — M25.521 RIGHT ELBOW PAIN: ICD-10-CM

## 2024-03-19 DIAGNOSIS — Z13.0 SCREENING FOR IRON DEFICIENCY ANEMIA: ICD-10-CM

## 2024-03-19 LAB
ALBUMIN SERPL BCG-MCNC: 4.4 G/DL (ref 3.5–5.2)
ALP SERPL-CCNC: 57 U/L (ref 40–150)
ALT SERPL W P-5'-P-CCNC: 16 U/L (ref 0–50)
ANION GAP SERPL CALCULATED.3IONS-SCNC: 11 MMOL/L (ref 7–15)
AST SERPL W P-5'-P-CCNC: 24 U/L (ref 0–45)
BILIRUB SERPL-MCNC: 0.4 MG/DL
BUN SERPL-MCNC: 9.7 MG/DL (ref 6–20)
CALCIUM SERPL-MCNC: 9.4 MG/DL (ref 8.6–10)
CHLORIDE SERPL-SCNC: 103 MMOL/L (ref 98–107)
CHOLEST SERPL-MCNC: 197 MG/DL
CREAT SERPL-MCNC: 0.79 MG/DL (ref 0.51–0.95)
DEPRECATED HCO3 PLAS-SCNC: 24 MMOL/L (ref 22–29)
EGFRCR SERPLBLD CKD-EPI 2021: >90 ML/MIN/1.73M2
ERYTHROCYTE [DISTWIDTH] IN BLOOD BY AUTOMATED COUNT: 11.8 % (ref 10–15)
FASTING STATUS PATIENT QL REPORTED: YES
GLUCOSE SERPL-MCNC: 81 MG/DL (ref 70–99)
HCT VFR BLD AUTO: 40.6 % (ref 35–47)
HDLC SERPL-MCNC: 69 MG/DL
HGB BLD-MCNC: 13.6 G/DL (ref 11.7–15.7)
LDLC SERPL CALC-MCNC: 106 MG/DL
MCH RBC QN AUTO: 31.4 PG (ref 26.5–33)
MCHC RBC AUTO-ENTMCNC: 33.5 G/DL (ref 31.5–36.5)
MCV RBC AUTO: 94 FL (ref 78–100)
NONHDLC SERPL-MCNC: 128 MG/DL
PLATELET # BLD AUTO: 320 10E3/UL (ref 150–450)
POTASSIUM SERPL-SCNC: 3.8 MMOL/L (ref 3.4–5.3)
PROT SERPL-MCNC: 7.1 G/DL (ref 6.4–8.3)
RBC # BLD AUTO: 4.33 10E6/UL (ref 3.8–5.2)
SODIUM SERPL-SCNC: 138 MMOL/L (ref 135–145)
TRIGL SERPL-MCNC: 111 MG/DL
TSH SERPL DL<=0.005 MIU/L-ACNC: 2.29 UIU/ML (ref 0.3–4.2)
WBC # BLD AUTO: 6.1 10E3/UL (ref 4–11)

## 2024-03-19 PROCEDURE — 80061 LIPID PANEL: CPT | Performed by: NURSE PRACTITIONER

## 2024-03-19 PROCEDURE — 85027 COMPLETE CBC AUTOMATED: CPT | Performed by: NURSE PRACTITIONER

## 2024-03-19 PROCEDURE — 80053 COMPREHEN METABOLIC PANEL: CPT | Performed by: NURSE PRACTITIONER

## 2024-03-19 PROCEDURE — 99396 PREV VISIT EST AGE 40-64: CPT | Performed by: NURSE PRACTITIONER

## 2024-03-19 PROCEDURE — G0123 SCREEN CERV/VAG THIN LAYER: HCPCS | Performed by: NURSE PRACTITIONER

## 2024-03-19 PROCEDURE — 36415 COLL VENOUS BLD VENIPUNCTURE: CPT | Performed by: NURSE PRACTITIONER

## 2024-03-19 PROCEDURE — 84443 ASSAY THYROID STIM HORMONE: CPT | Performed by: NURSE PRACTITIONER

## 2024-03-19 PROCEDURE — 87624 HPV HI-RISK TYP POOLED RSLT: CPT | Performed by: NURSE PRACTITIONER

## 2024-03-19 SDOH — HEALTH STABILITY: PHYSICAL HEALTH: ON AVERAGE, HOW MANY MINUTES DO YOU ENGAGE IN EXERCISE AT THIS LEVEL?: 30 MIN

## 2024-03-19 SDOH — HEALTH STABILITY: PHYSICAL HEALTH: ON AVERAGE, HOW MANY DAYS PER WEEK DO YOU ENGAGE IN MODERATE TO STRENUOUS EXERCISE (LIKE A BRISK WALK)?: 5 DAYS

## 2024-03-19 ASSESSMENT — PAIN SCALES - GENERAL: PAINLEVEL: SEVERE PAIN (7)

## 2024-03-19 ASSESSMENT — SOCIAL DETERMINANTS OF HEALTH (SDOH): HOW OFTEN DO YOU GET TOGETHER WITH FRIENDS OR RELATIVES?: TWICE A WEEK

## 2024-03-19 NOTE — PROGRESS NOTES
"Preventive Care Visit  [facility]  MANUEL Farias CNP, Nurse Practitioner Primary Care  Funmilayo Bales, NP Student  Mar 19, 2024      Assessment & Plan     Routine general medical examination at a health care facility  Health maintenance reviewed and updated as necessary    Right elbow pain  Continue PT sessions, if persisting after a couple more weeks- would recommend ortho referral, can send me a myChart message and I will place referral.    Cervical cancer screening  - Pap Screen with HPV - recommended age 30 - 65 years    Lipid screening  - Lipid panel reflex to direct LDL Fasting  - Lipid panel reflex to direct LDL Fasting    Screening for iron deficiency anemia  - CBC with platelets  - CBC with platelets    Screening for diabetes mellitus  - Comprehensive metabolic panel (BMP + Alb, Alk Phos, ALT, AST, Total. Bili, TP)  - Comprehensive metabolic panel (BMP + Alb, Alk Phos, ALT, AST, Total. Bili, TP)    Screening for thyroid disorder  - TSH with free T4 reflex  - TSH with free T4 reflex        BMI  Estimated body mass index is 25.78 kg/m  as calculated from the following:    Height as of this encounter: 1.552 m (5' 1.1\").    Weight as of this encounter: 62.1 kg (136 lb 14.4 oz).       Counseling  Appropriate preventive services were discussed with this patient, including applicable screening as appropriate for fall prevention, nutrition, physical activity, Tobacco-use cessation, weight loss and cognition.  Checklist reviewing preventive services available has been given to the patient.  Reviewed patient's diet, addressing concerns and/or questions.   She is at risk for psychosocial distress and has been provided with information to reduce risk.     Alex York is a 49 year old, presenting for the following:  Physical (And pap)        3/19/2024     8:01 AM   Additional Questions   Roomed by Tamica MOSQUEDA   Accompanied by self        Health Care Directive  Patient does not have " a Health Care Directive or Living Will: Discussed advance care planning with patient; however, patient declined at this time.    KRISTI York here today for routine physical    Persistent right elbow pain for about 3 months now, has done 2 PT sessions so far. Pain is worsened with movement such as mixing food in a bowl or lifting things. She is unable to do upper body workouts due to pain.     Going to [employer] soon to see her daughter who is studying there.          3/19/2024   General Health   How would you rate your overall physical health? Good   Feel stress (tense, anxious, or unable to sleep) Only a little   (!) STRESS CONCERN      3/19/2024   Nutrition   Three or more servings of calcium each day? (!) I DON'T KNOW   Diet: Regular (no restrictions)   How many servings of fruit and vegetables per day? 4 or more   How many sweetened beverages each day? 0-1         3/19/2024   Exercise   Days per week of moderate/strenous exercise 5 days   Average minutes spent exercising at this level 30 min         3/19/2024   Social Factors   Frequency of gathering with friends or relatives Twice a week   Worry food won't last until get money to buy more No   Food not last or not have enough money for food? No   Do you have housing?  Yes   Are you worried about losing your housing? No   Lack of transportation? No   Unable to get utilities (heat,electricity)? No         3/19/2024   Dental   Dentist two times every year? Yes            Today's PHQ-2 Score:       3/19/2024     7:48 AM   PHQ-2 ( 1999 [employer])   Q1: Little interest or pleasure in doing things 0   Q2: Feeling down, depressed or hopeless 0   PHQ-2 Score 0   Q1: Little interest or pleasure in doing things Not at all   Q2: Feeling down, depressed or hopeless Not at all   PHQ-2 Score 0           3/19/2024   Substance Use   Alcohol more than 3/day or more than 7/wk No   Do you use any other substances recreationally? No     Social History     Tobacco Use    Smoking status:  Never     Passive exposure: Never    Smokeless tobacco: Never   Vaping Use    Vaping Use: Never used   Substance Use Topics    Alcohol use: Yes     Alcohol/week: 0.8 standard drinks of alcohol     Comment: 2 drinks 2 times per week    Drug use: No             3/19/2024   Breast Cancer Screening   Family history of breast, colon, or ovarian cancer? No / Unknown         8/14/2023   LAST FHS-7 RESULTS   1st degree relative breast or ovarian cancer No   Any relative bilateral breast cancer No   Any male have breast cancer No   Any ONE woman have BOTH breast AND ovarian cancer No   Any woman with breast cancer before 50yrs No   2 or more relatives with breast AND/OR ovarian cancer No   2 or more relatives with breast AND/OR bowel cancer No        Mammogram Screening - Mammogram every 1-2 years updated in Health Maintenance based on mutual decision making        3/19/2024   STI Screening   New sexual partner(s) since last STI/HIV test? No     History of abnormal Pap smear: NO - age 30-65 PAP every 5 years with negative HPV co-testing recommended        Latest Ref Rng & Units 3/18/2021     8:04 AM 2/2/2018    11:06 AM 2/2/2018    10:32 AM   PAP / HPV   PAP (Historical)    NIL    HPV 16 DNA NEG^Negative  Negative     HPV 18 DNA NEG^Negative  Negative     Other HR HPV NEG^Negative  Negative     PAP-ABSTRACT  See Scanned Document             This result is from an external source.     ASCVD Risk   The 10-year ASCVD risk score (Juan C ONTIVEROS, et al., 2019) is: 0.5%    Values used to calculate the score:      Age: 49 years      Sex: Female      Is Non- : No      Diabetic: No      Tobacco smoker: No      Systolic Blood Pressure: 110 mmHg      Is BP treated: No      HDL Cholesterol: 80 mg/dL      Total Cholesterol: 188 mg/dL        3/19/2024   Contraception/Family Planning   Questions about contraception or family planning No        Reviewed and updated as needed this visit by Provider                 "        Review of Systems  Constitutional, HEENT, cardiovascular, pulmonary, gi and gu systems are negative, except as otherwise noted.     Objective    Exam  /72 (BP Location: Right arm, Patient Position: Sitting, Cuff Size: Adult Regular)   Pulse 63   Temp 97.8  F (36.6  C) (Temporal)   Resp 16   Ht 1.552 m (5' 1.1\")   Wt 62.1 kg (136 lb 14.4 oz)   LMP  (LMP Unknown)   SpO2 99%   BMI 25.78 kg/m     Estimated body mass index is 25.78 kg/m  as calculated from the following:    Height as of this encounter: 1.552 m (5' 1.1\").    Weight as of this encounter: 62.1 kg (136 lb 14.4 oz).    Physical Exam  GENERAL: alert and no distress  EYES: Eyes grossly normal to inspection, PERRL and conjunctivae and sclerae normal  HENT: ear canals and TM's normal, mouth without ulcers or lesions  NECK: no adenopathy, no asymmetry, masses, or scars  RESP: lungs clear to auscultation - no rales, rhonchi or wheezes  BREAST: normal without masses, tenderness or nipple discharge and no palpable axillary masses or adenopathy  CV: regular rate and rhythm, normal S1 S2, no S3 or S4, no murmur, click or rub, no peripheral edema  ABDOMEN: soft, nontender, no hepatosplenomegaly, no masses    (female): IUD strings visible at cervical os. normal female external genitalia, normal urethral meatus, normal vaginal mucosa  MS: no gross musculoskeletal defects noted, no edema  SKIN: no suspicious lesions or rashes  NEURO: Normal strength and tone, mentation intact and speech normal  PSYCH: mentation appears normal, affect normal/bright      Funmilayo Bales NP Student    Physician Attestation   I, MANUEL Farias CNP, was present with the medical/[employer] student who participated in the service and in the documentation of the note.  I have verified the history and personally performed the physical exam and medical decision making.  I agree with the assessment and plan of care as documented in the note.        MANUEL Farias CNP "   Signed Electronically by: MANUEL Farias CNP

## 2024-03-19 NOTE — PATIENT INSTRUCTIONS
Preventive Care Advice   This is general advice given by our system to help you stay healthy. However, your care team may have specific advice just for you. Please talk to your care team about your preventive care needs.  Nutrition  Eat 5 or more servings of fruits and vegetables each day.  Try wheat bread, brown rice and whole grain pasta (instead of white bread, rice, and pasta).  Get enough calcium and vitamin D. Check the label on foods and aim for 100% of the RDA (recommended daily allowance).  Lifestyle  Exercise at least 150 minutes each week   (30 minutes a day, 5 days a week).  Do muscle strengthening activities 2 days a week. These help control your weight and prevent disease.  No smoking.  Wear sunscreen to prevent skin cancer.  Have a dental exam and cleaning every 6 months.  Yearly exams  See your health care team every year to talk about:  Any changes in your health.  Any medicines your care team has prescribed.  Preventive care, family planning, and ways to prevent chronic diseases.  Shots (vaccines)   HPV shots (up to age 26), if you've never had them before.  Hepatitis B shots (up to age 59), if you've never had them before.  COVID-19 shot: Get this shot when it's due.  Flu shot: Get a flu shot every year.  Tetanus shot: Get a tetanus shot every 10 years.  Pneumococcal, hepatitis A, and RSV shots: Ask your care team if you need these based on your risk.  Shingles shot (for age 50 and up).  General health tests  Diabetes screening:  Starting at age 35, Get screened for diabetes at least every 3 years.  If you are younger than age 35, ask your care team if you should be screened for diabetes.  Cholesterol test: At age 39, start having a cholesterol test every 5 years, or more often if advised.  Bone density scan (DEXA): At age 50, ask your care team if you should have this scan for osteoporosis (brittle bones).  Hepatitis C: Get tested at least once in your life.  STIs (sexually transmitted  infections)  Before age 24: Ask your care team if you should be screened for STIs.  After age 24: Get screened for STIs if you're at risk. You are at risk for STIs (including HIV) if:  You are sexually active with more than one person.  You don't use condoms every time.  You or a partner was diagnosed with a sexually transmitted infection.  If you are at risk for HIV, ask about PrEP medicine to prevent HIV.  Get tested for HIV at least once in your life, whether you are at risk for HIV or not.  Cancer screening tests  Cervical cancer screening: If you have a cervix, begin getting regular cervical cancer screening tests at age 21. Most people who have regular screenings with normal results can stop after age 65. Talk about this with your provider.  Breast cancer scan (mammogram): If you've ever had breasts, begin having regular mammograms starting at age 40. This is a scan to check for breast cancer.  Colon cancer screening: It is important to start screening for colon cancer at age 45.  Have a colonoscopy test every 10 years (or more often if you're at risk) Or, ask your provider about stool tests like a FIT test every year or Cologuard test every 3 years.  To learn more about your testing options, visit: https://[email or web address].pdf.  For help making a decision, visit: https://bit.ly/bz60144.  Prostate cancer screening test: If you have a prostate and are age 55 to 69, ask your provider if you would benefit from a yearly prostate cancer screening test.  Lung cancer screening: If you are a current or former smoker age 50 to 80, ask your care team if ongoing lung cancer screenings are right for you.  For informational purposes only. Not to replace the advice of your health care provider. Copyright   2023 [employer]. All rights reserved. Clinically reviewed by the [facility]. [employer] 180518 - REV 01/24.    Learning About Stress  What is stress?     Stress is your  body's response to a hard situation. Your body can have a physical, emotional, or mental response. Stress is a fact of life for most people, and it affects everyone differently. What causes stress for you may not be stressful for someone else.  A lot of things can cause stress. You may feel stress when you go on a job interview, take a test, or run a race. This kind of short-term stress is normal and even useful. It can help you if you need to work hard or react quickly. For example, stress can help you finish an important job on time.  Long-term stress is caused by ongoing stressful situations or events. Examples of long-term stress include long-term health problems, ongoing problems at work, or conflicts in your family. Long-term stress can harm your health.  How does stress affect your health?  When you are stressed, your body responds as though you are in danger. It makes hormones that speed up your heart, make you breathe faster, and give you a burst of energy. This is called the fight-or-flight stress response. If the stress is over quickly, your body goes back to normal and no harm is done.  But if stress happens too often or lasts too long, it can have bad effects. Long-term stress can make you more likely to get sick, and it can make symptoms of some diseases worse. If you tense up when you are stressed, you may develop neck, shoulder, or low back pain. Stress is linked to high blood pressure and heart disease.  Stress also harms your emotional health. It can make you guzman, tense, or depressed. Your relationships may suffer, and you may not do well at work or school.  What can you do to manage stress?  You can try these things to help manage stress:   Do something active. Exercise or activity can help reduce stress. Walking is a great way to get started. Even everyday activities such as housecleaning or yard work can help.  Try yoga or peggy chi. These techniques combine exercise and meditation. You may need  some training at first to learn them.  Do something you enjoy. For example, listen to music or go to a movie. Practice your hobby or do volunteer work.  Meditate. This can help you relax, because you are not worrying about what happened before or what may happen in the future.  Do guided imagery. Imagine yourself in any setting that helps you feel calm. You can use online videos, books, or a teacher to guide you.  Do breathing exercises. For example:  From a standing position, bend forward from the waist with your knees slightly bent. Let your arms dangle close to the floor.  Breathe in slowly and deeply as you return to a standing position. Roll up slowly and lift your head last.  Hold your breath for just a few seconds in the standing position.  Breathe out slowly and bend forward from the waist.  Let your feelings out. Talk, laugh, cry, and express anger when you need to. Talking with supportive friends or family, a counselor, or a [place] leader about your feelings is a healthy way to relieve stress. Avoid discussing your feelings with people who make you feel worse.  Write. It may help to write about things that are bothering you. This helps you find out how much stress you feel and what is causing it. When you know this, you can find better ways to cope.  What can you do to prevent stress?  You might try some of these things to help prevent stress:  Manage your time. This helps you find time to do the things you want and need to do.  Get enough sleep. Your body recovers from the stresses of the day while you are sleeping.  Get support. Your family, friends, and community can make a difference in how you experience stress.  Limit your news feed. Avoid or limit time on social media or news that may make you feel stressed.  Do something active. Exercise or activity can help reduce stress. Walking is a great way to get started.  Where can you learn more?  Go to https://www.healthwise.net/patiented  Enter N032 in the  "search box to learn more about \"Learning About Stress.\"  Current as of: October 24, 2023               Content Version: 14.0    6995-2930 [employer].   Care instructions adapted under license by your healthcare professional. If you have questions about a medical condition or this instruction, always ask your healthcare professional. [employer] disclaims any warranty or liability for your use of this information.      "

## 2024-03-22 LAB
BKR LAB AP GYN ADEQUACY: NORMAL
BKR LAB AP GYN INTERPRETATION: NORMAL
BKR LAB AP HPV REFLEX: NORMAL
BKR LAB AP PREVIOUS ABNORMAL: NORMAL
PATH REPORT.COMMENTS IMP SPEC: NORMAL
PATH REPORT.COMMENTS IMP SPEC: NORMAL
PATH REPORT.RELEVANT HX SPEC: NORMAL

## 2024-03-25 LAB
HUMAN PAPILLOMA VIRUS 16 DNA: NEGATIVE
HUMAN PAPILLOMA VIRUS 18 DNA: NEGATIVE
HUMAN PAPILLOMA VIRUS FINAL DIAGNOSIS: NORMAL
HUMAN PAPILLOMA VIRUS OTHER HR: NEGATIVE

## 2024-04-02 NOTE — PROGRESS NOTES
ASSESSMENT & PLAN  Patient Instructions     1. Lateral epicondylitis of right elbow      -Patient has chronic right elbow pain due to tendinitis  -Patient tolerated right lateral condyle cortisone injection today without complications.  Patient was given postprocedure instructions  -Patient will start home exercise program since she is going to be traveling abroad in the upcoming weeks  -Patient may purchase an over-the-counter cock up wrist brace to stabilize her wrist and prevent extension aggravating her elbow.  Patient was taught how to lift so as not to significantly aggravate her elbow pain  -Patient will start formal hand therapy when she returns from her trip  -Patient will follow-up if pain does not improve  -Call direct clinic number [[phone]] at any time with questions or concerns.    Albert Yeo MD [facility]          -----    SUBJECTIVE  Chela Villatoro is a/an 49 year old Right handed female who is seen as a self referral for evaluation of right elbow pain. The patient is seen by themselves.    Onset: 5 month(s) ago. Reports insidious onset without acute precipitating event.  Location of Pain: right lateral elbow   Rating of Pain at worst: 9/10  Rating of Pain Currently: 0/10  Worsened by: Lifting objects, making a fist  Better with: Rest  Treatments tried: rest/activity avoidance, ice, heat, and ibuprofen physical therapy (1.5 months, 3 sessions)  Associated symptoms: no distal numbness or tingling; denies swelling or warmth  Orthopedic history: NO  Relevant surgical history: NO  Social history: social history: works at  in hospital    Past Medical History:   Diagnosis Date    ASCUS favor benign 01/2015    neg HPV Plan cotest in 3 yrs.    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   Other Topics Concern    Parent/sibling w/ CABG, MI or angioplasty before 65F 55M? No   Social History Narrative    Caffeine intake/servings daily - 1    Calcium intake/servings daily - 1-2    Exercise 3 times weekly - describe walking, running    Sunscreen used - Yes    Seatbelts used - Yes    Guns stored in the home - No    Self Breast Exam - occ    Pap test up to date -  Last pap 5/13/2010    Eye exam up to date -  Yes    Dental exam up to date -  Yes    DEXA scan up to date -  Not Applicable    Flex Sig/Colonoscopy up to date -  Not Applicable    Mammography up to date -  Not Applicable    Immunizations reviewed and up to date - tdap 2008. Flu shot already    Abuse: Current or Past (Physical, Sexual or Emotional) - No    Do you feel safe in your environment - Yes    Do you cope well with stress - Yes    Do you suffer from insomnia - No    Last updated by: Shruti Eason MA.                             Social Determinants of Health     Financial Resource Strain: Low Risk  (3/19/2024)    Financial Resource Strain     Within the past 12 months, have you or your family members you live with been unable to get utilities (heat, electricity) when it was really needed?: No   Food Insecurity: Low Risk  (3/19/2024)    Food Insecurity     Within the past 12 months, did you worry that your food would run out before you got money to buy more?: No     Within the past 12 months, did the food you bought just not last and you didn t have money to get more?: No   Transportation Needs: Low Risk  (3/19/2024)     "Transportation Needs     Within the past 12 months, has lack of transportation kept you from medical appointments, getting your medicines, non-medical meetings or appointments, work, or from getting things that you need?: No   Physical Activity: Sufficiently Active (3/19/2024)    Exercise Vital Sign     Days of Exercise per Week: 5 days     Minutes of Exercise per Session: 30 min   Stress: No Stress Concern Present (3/19/2024)    [employer] - Occupational Stress Questionnaire     Feeling of Stress : Only a little   Social Connections: Unknown (3/19/2024)    Social Connection and Isolation Panel [NHANES]     Frequency of Social Gatherings with Friends and Family: Twice a week   Interpersonal Safety: Low Risk  (3/19/2024)    Interpersonal Safety     Do you feel physically and emotionally safe where you currently live?: Yes     Within the past 12 months, have you been hit, slapped, kicked or otherwise physically hurt by someone?: No     Within the past 12 months, have you been humiliated or emotionally abused in other ways by your partner or ex-partner?: No   Housing Stability: Low Risk  (3/19/2024)    Housing Stability     Do you have housing? : Yes     Are you worried about losing your housing?: No         Patient's past medical, surgical, social, and family histories were reviewed today and no changes are noted.    REVIEW OF SYSTEMS:  10 point ROS is negative other than symptoms noted above in HPI, Past Medical History or as stated below  Constitutional: NEGATIVE for fever, chills, change in weight  Skin: NEGATIVE for worrisome rashes, moles or lesions  GI/: NEGATIVE for bowel or bladder changes  Neuro: NEGATIVE for weakness, dizziness or paresthesias    OBJECTIVE:  /74   Ht 1.549 m (5' 1\")   Wt 61.7 kg (136 lb)   LMP  (LMP Unknown)   BMI 25.70 kg/m     General: healthy, alert and in no distress  HEENT: no scleral icterus or conjunctival erythema  Skin: no suspicious lesions " or rash. No jaundice.  CV: regular rhythm by palpation  Resp: normal respiratory effort without conversational dyspnea   Psych: normal mood and affect  Gait: normal steady gait with appropriate coordination and balance  Neuro: normal light touch sensory exam of the bilateral upper extremities.    MSK:    RIGHT ELBOW  Inspection:    No swelling, bruising, discoloration, or obvious deformity or asymmetry  Palpation:    Tender about the lateral epicondyle, common extensor tendon. Remainder of bony, ligamentous and tendinous landmarks are nontender.    Crepitus is Absent  Range of Motion:     Extension full / flexion full / pronation full / supination full  Strength:    extension limited by pain pronation limited by pain supination limited by pain  Special Tests:    Positive: Pain with resisted wrist extension, pain with resisted middle finger extension, pain with resisted supination, pain with resisted pronation    Negative: pain with resisted wrist flexion    Independent visualization of the below image:  No results found for this or any previous visit (from the past 24 hour(s)).    Hand / Upper Extremity Injection/Arthrocentesis: R elbow    Date/Time: 4/3/2024 9:22 AM    Performed by: Yeo, Albert, MD  Authorized by: Yeo, Albert, MD    Indications:  Pain  Needle Size:  25 G  Guidance: ultrasound    Approach:  Lateral  Condition: epicondylitis, lateral      Site:  R elbow  Medications:  40 mg methylPREDNISolone 40 MG/ML; 2 mL lidocaine 1 %  Outcome:  Tolerated well, no immediate complications  Procedure discussed: discussed risks, benefits, and alternatives    Consent Given by:  Patient  Timeout: timeout called immediately prior to procedure    Prep: patient was prepped and draped in usual sterile fashion     Ultrasound was used to ensure safe and accurate needle placement and injection. Ultrasound images of the procedure were permanently stored.        Patient's conditions were thoroughly discussed during today's  visit with total time spent face-to-face with the patient and documentation being 30 minutes.    Albert Yeo MD CAM  [facility]

## 2024-04-03 ENCOUNTER — OFFICE VISIT (OUTPATIENT)
Dept: ORTHOPEDICS | Facility: CLINIC | Age: 49
End: 2024-04-03
Payer: COMMERCIAL

## 2024-04-03 VITALS
WEIGHT: 136 LBS | DIASTOLIC BLOOD PRESSURE: 74 MMHG | SYSTOLIC BLOOD PRESSURE: 118 MMHG | BODY MASS INDEX: 25.68 KG/M2 | HEIGHT: 61 IN

## 2024-04-03 DIAGNOSIS — M77.11 LATERAL EPICONDYLITIS OF RIGHT ELBOW: Primary | ICD-10-CM

## 2024-04-03 PROCEDURE — 20551 NJX 1 TENDON ORIGIN/INSJ: CPT | Mod: RT | Performed by: FAMILY MEDICINE

## 2024-04-03 PROCEDURE — 76942 ECHO GUIDE FOR BIOPSY: CPT | Performed by: FAMILY MEDICINE

## 2024-04-03 PROCEDURE — 99214 OFFICE O/P EST MOD 30 MIN: CPT | Mod: 25 | Performed by: FAMILY MEDICINE

## 2024-04-03 RX ORDER — LIDOCAINE HYDROCHLORIDE 10 MG/ML
2 INJECTION, SOLUTION INFILTRATION; PERINEURAL
Status: SHIPPED | OUTPATIENT
Start: 2024-04-03

## 2024-04-03 RX ORDER — METHYLPREDNISOLONE ACETATE 40 MG/ML
40 INJECTION, SUSPENSION INTRA-ARTICULAR; INTRALESIONAL; INTRAMUSCULAR; SOFT TISSUE
Status: SHIPPED | OUTPATIENT
Start: 2024-04-03

## 2024-04-03 RX ADMIN — METHYLPREDNISOLONE ACETATE 40 MG: 40 INJECTION, SUSPENSION INTRA-ARTICULAR; INTRALESIONAL; INTRAMUSCULAR; SOFT TISSUE at 09:22

## 2024-04-03 RX ADMIN — LIDOCAINE HYDROCHLORIDE 2 ML: 10 INJECTION, SOLUTION INFILTRATION; PERINEURAL at 09:22

## 2024-04-03 NOTE — PATIENT INSTRUCTIONS
1. Lateral epicondylitis of right elbow      -Patient has chronic right elbow pain due to tendinitis  -We discussed treatment options including oral medications, cortisone injection and hand therapy.  Patient was advised that cortisone injections may improve the pain more quickly in the short-term but has poor outcomes in the long-term.  Patient understands this but is traveling to [place] in a few weeks and is concerned about lifting her bags and activities while she is away  -Patient tolerated right lateral condyle cortisone injection today without complications.  Patient was given postprocedure instructions  -Patient will start home exercise program since she is going to be traveling abroad in the upcoming weeks  -Patient may purchase an over-the-counter cock up wrist brace to stabilize her wrist and prevent extension aggravating her elbow.  Patient was taught how to lift so as not to significantly aggravate her elbow pain  -Patient will start formal hand therapy when she returns from her trip  -Patient will follow-up if pain does not improve  -Call direct clinic number [[phone]] at any time with questions or concerns.    Albert Yeo MD [facility]  [facility]

## 2024-04-03 NOTE — LETTER
4/3/2024         RE: Chela Villatoro  [address] [id number]        Dear Colleague,    Thank you for referring your patient, Chela Villatoro, to the [facility] [place]. Please see a copy of my visit note below.    ASSESSMENT & PLAN  Patient Instructions     1. Lateral epicondylitis of right elbow      -Patient has chronic right elbow pain due to tendinitis  -Patient tolerated right lateral condyle cortisone injection today without complications.  Patient was given postprocedure instructions  -Patient will start home exercise program since she is going to be traveling [place] in the upcoming weeks  -Patient may purchase an over-the-counter cock up wrist brace to stabilize her wrist and prevent extension aggravating her elbow.  Patient was taught how to lift so as not to significantly aggravate her elbow pain  -Patient will start formal hand therapy when she returns from her trip  -Patient will follow-up if pain does not improve  -Call direct clinic number [[phone]] at any time with questions or concerns.    Albert Yeo MD [facility]  [facility]        -----    SUBJECTIVE  Chela Villatoro is a/an 49 year old Right handed female who is seen as a self referral for evaluation of right elbow pain. The patient is seen by themselves.    Onset: 5 month(s) ago. Reports insidious onset without acute precipitating event.  Location of Pain: right lateral elbow   Rating of Pain at worst: 9/10  Rating of Pain Currently: 0/10  Worsened by: Lifting objects, making a fist  Better with: Rest  Treatments tried: rest/activity avoidance, ice, heat, and ibuprofen physical therapy (1.5 months, 3 sessions)  Associated symptoms: no distal numbness or tingling; denies swelling or warmth  Orthopedic history: NO  Relevant surgical history: NO  Social history: social history: works at  in hospital    Past Medical History:    Diagnosis Date     ASCUS favor benign 01/2015    neg HPV Plan cotest in 3 yrs.     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   Other Topics Concern     Parent/sibling w/ CABG, MI or angioplasty before 65F 55M? No   Social History Narrative    Caffeine intake/servings daily - 1    Calcium intake/servings daily - 1-2    Exercise 3 times weekly - describe walking, running    Sunscreen used - Yes    Seatbelts used - Yes    Guns stored in the home - No    Self Breast Exam - occ    Pap test up to date -  Last pap 5/13/2010    Eye exam up to date -  Yes    Dental exam up to date -  Yes    DEXA scan up to date -  Not Applicable    Flex Sig/Colonoscopy up to date -  Not Applicable    Mammography up to date -  Not Applicable    Immunizations reviewed and up to date - tdap 2008. Flu shot already    Abuse: Current or Past (Physical, Sexual or Emotional) - No    Do you feel safe in your environment - Yes    Do you cope well with stress - Yes    Do you suffer from insomnia - No    Last updated by: Shruti Eason MA.                             Social Determinants of Health     Financial Resource Strain: Low Risk  (3/19/2024)    Financial Resource Strain      Within the past 12 months, have you or your family members you live with been unable to get utilities (heat, electricity) when it was really needed?: No   Food Insecurity: Low Risk  (3/19/2024)    Food Insecurity       Within the past 12 months, did you worry that your food would run out before you got money to buy more?: No      Within the past 12 months, did the food you bought just not last and you didn t have money to get more?: No   Transportation Needs: Low Risk  (3/19/2024)    Transportation Needs      Within the past 12 months, has lack of transportation kept you from medical appointments, getting your medicines, non-medical meetings or appointments, work, or from getting things that you need?: No   Physical Activity: Sufficiently Active (3/19/2024)    Exercise Vital Sign      Days of Exercise per Week: 5 days      Minutes of Exercise per Session: 30 min   Stress: No Stress Concern Present (3/19/2024)    [place] [employer] - Occupational Stress Questionnaire      Feeling of Stress : Only a little   Social Connections: Unknown (3/19/2024)    Social Connection and Isolation Panel [NHANES]      Frequency of Social Gatherings with Friends and Family: Twice a week   Interpersonal Safety: Low Risk  (3/19/2024)    Interpersonal Safety      Do you feel physically and emotionally safe where you currently live?: Yes      Within the past 12 months, have you been hit, slapped, kicked or otherwise physically hurt by someone?: No      Within the past 12 months, have you been humiliated or emotionally abused in other ways by your partner or ex-partner?: No   Housing Stability: Low Risk  (3/19/2024)    Housing Stability      Do you have housing? : Yes      Are you worried about losing your housing?: No         Patient's past medical, surgical, social, and family histories were reviewed today and no changes are noted.    REVIEW OF SYSTEMS:  10 point ROS is negative other than symptoms noted above in HPI, Past Medical History or as stated below  Constitutional: NEGATIVE for fever, chills, change in weight  Skin: NEGATIVE for worrisome rashes, moles or lesions  GI/: NEGATIVE for bowel or bladder changes  Neuro:  "NEGATIVE for weakness, dizziness or paresthesias    OBJECTIVE:  /74   Ht 1.549 m (5' 1\")   Wt 61.7 kg (136 lb)   LMP  (LMP Unknown)   BMI 25.70 kg/m     General: healthy, alert and in no distress  HEENT: no scleral icterus or conjunctival erythema  Skin: no suspicious lesions or rash. No jaundice.  CV: regular rhythm by palpation  Resp: normal respiratory effort without conversational dyspnea   Psych: normal mood and affect  Gait: normal steady gait with appropriate coordination and balance  Neuro: normal light touch sensory exam of the bilateral upper extremities.    MSK:    RIGHT ELBOW  Inspection:    No swelling, bruising, discoloration, or obvious deformity or asymmetry  Palpation:    Tender about the lateral epicondyle, common extensor tendon. Remainder of bony, ligamentous and tendinous landmarks are nontender.    Crepitus is Absent  Range of Motion:     Extension full / flexion full / pronation full / supination full  Strength:    extension limited by pain pronation limited by pain supination limited by pain  Special Tests:    Positive: Pain with resisted wrist extension, pain with resisted middle finger extension, pain with resisted supination, pain with resisted pronation    Negative: pain with resisted wrist flexion    Independent visualization of the below image:  No results found for this or any previous visit (from the past 24 hour(s)).    Hand / Upper Extremity Injection/Arthrocentesis: R elbow    Date/Time: 4/3/2024 9:22 AM    Performed by: Yeo, Albert, MD  Authorized by: Yeo, Albert, MD    Indications:  Pain  Needle Size:  25 G  Guidance: ultrasound    Approach:  Lateral  Condition: epicondylitis, lateral      Site:  R elbow  Medications:  40 mg methylPREDNISolone 40 MG/ML; 2 mL lidocaine 1 %  Outcome:  Tolerated well, no immediate complications  Procedure discussed: discussed risks, benefits, and alternatives    Consent Given by:  Patient  Timeout: timeout called immediately prior to " procedure    Prep: patient was prepped and draped in usual sterile fashion     Ultrasound was used to ensure safe and accurate needle placement and injection. Ultrasound images of the procedure were permanently stored.        Patient's conditions were thoroughly discussed during today's visit with total time spent face-to-face with the patient and documentation being 30 minutes.    Albert Yeo MD [facility]      Again, thank you for allowing me to participate in the care of your patient.        Sincerely,        Albert Yeo, MD

## 2024-05-28 ENCOUNTER — THERAPY VISIT (OUTPATIENT)
Dept: OCCUPATIONAL THERAPY | Facility: CLINIC | Age: 49
End: 2024-05-28
Payer: COMMERCIAL

## 2024-05-28 DIAGNOSIS — M25.521 RIGHT ELBOW PAIN: Primary | ICD-10-CM

## 2024-05-28 PROCEDURE — 97165 OT EVAL LOW COMPLEX 30 MIN: CPT | Mod: GO | Performed by: OCCUPATIONAL THERAPIST

## 2024-05-28 PROCEDURE — 97035 APP MDLTY 1+ULTRASOUND EA 15: CPT | Mod: GO | Performed by: OCCUPATIONAL THERAPIST

## 2024-05-28 PROCEDURE — 97530 THERAPEUTIC ACTIVITIES: CPT | Mod: GO | Performed by: OCCUPATIONAL THERAPIST

## 2024-06-11 ENCOUNTER — THERAPY VISIT (OUTPATIENT)
Dept: OCCUPATIONAL THERAPY | Facility: CLINIC | Age: 49
End: 2024-06-11
Payer: COMMERCIAL

## 2024-06-11 DIAGNOSIS — M25.521 RIGHT ELBOW PAIN: Primary | ICD-10-CM

## 2024-06-11 PROCEDURE — 97760 ORTHOTIC MGMT&TRAING 1ST ENC: CPT | Mod: GO | Performed by: OCCUPATIONAL THERAPIST

## 2024-06-11 PROCEDURE — 97140 MANUAL THERAPY 1/> REGIONS: CPT | Mod: GO | Performed by: OCCUPATIONAL THERAPIST

## 2024-06-11 PROCEDURE — 97035 APP MDLTY 1+ULTRASOUND EA 15: CPT | Mod: GO | Performed by: OCCUPATIONAL THERAPIST

## 2025-05-04 ENCOUNTER — HEALTH MAINTENANCE LETTER (OUTPATIENT)
Age: 50
End: 2025-05-04

## 2025-07-21 ENCOUNTER — PATIENT OUTREACH (OUTPATIENT)
Dept: CARE COORDINATION | Facility: CLINIC | Age: 50
End: 2025-07-21
Payer: COMMERCIAL